# Patient Record
Sex: FEMALE | Race: BLACK OR AFRICAN AMERICAN | NOT HISPANIC OR LATINO | Employment: FULL TIME | URBAN - METROPOLITAN AREA
[De-identification: names, ages, dates, MRNs, and addresses within clinical notes are randomized per-mention and may not be internally consistent; named-entity substitution may affect disease eponyms.]

---

## 2019-05-06 ENCOUNTER — HOSPITAL ENCOUNTER (OUTPATIENT)
Dept: LAB | Facility: MEDICAL CENTER | Age: 29
End: 2019-05-06
Attending: OBSTETRICS & GYNECOLOGY
Payer: COMMERCIAL

## 2019-05-06 LAB
ABO GROUP BLD: NORMAL
BASOPHILS # BLD AUTO: 0.5 % (ref 0–1.8)
BASOPHILS # BLD: 0.06 K/UL (ref 0–0.12)
BLD GP AB SCN SERPL QL: NORMAL
EOSINOPHIL # BLD AUTO: 0.14 K/UL (ref 0–0.51)
EOSINOPHIL NFR BLD: 1.1 % (ref 0–6.9)
ERYTHROCYTE [DISTWIDTH] IN BLOOD BY AUTOMATED COUNT: 35.7 FL (ref 35.9–50)
HBV SURFACE AG SER QL: NEGATIVE
HCT VFR BLD AUTO: 41.1 % (ref 37–47)
HCV AB SER QL: NEGATIVE
HGB BLD-MCNC: 13.9 G/DL (ref 12–16)
HIV 1+2 AB+HIV1 P24 AG SERPL QL IA: NON REACTIVE
IMM GRANULOCYTES # BLD AUTO: 0.04 K/UL (ref 0–0.11)
IMM GRANULOCYTES NFR BLD AUTO: 0.3 % (ref 0–0.9)
LYMPHOCYTES # BLD AUTO: 2.06 K/UL (ref 1–4.8)
LYMPHOCYTES NFR BLD: 16.2 % (ref 22–41)
MCH RBC QN AUTO: 26.8 PG (ref 27–33)
MCHC RBC AUTO-ENTMCNC: 33.8 G/DL (ref 33.6–35)
MCV RBC AUTO: 79.3 FL (ref 81.4–97.8)
MONOCYTES # BLD AUTO: 0.91 K/UL (ref 0–0.85)
MONOCYTES NFR BLD AUTO: 7.2 % (ref 0–13.4)
NEUTROPHILS # BLD AUTO: 9.51 K/UL (ref 2–7.15)
NEUTROPHILS NFR BLD: 74.7 % (ref 44–72)
NRBC # BLD AUTO: 0 K/UL
NRBC BLD-RTO: 0 /100 WBC
PLATELET # BLD AUTO: 304 K/UL (ref 164–446)
PMV BLD AUTO: 10.1 FL (ref 9–12.9)
RBC # BLD AUTO: 5.18 M/UL (ref 4.2–5.4)
RH BLD: NORMAL
RUBV AB SER QL: >500 IU/ML
TREPONEMA PALLIDUM IGG+IGM AB [PRESENCE] IN SERUM OR PLASMA BY IMMUNOASSAY: NON REACTIVE
WBC # BLD AUTO: 12.7 K/UL (ref 4.8–10.8)

## 2019-05-06 PROCEDURE — 85025 COMPLETE CBC W/AUTO DIFF WBC: CPT

## 2019-05-06 PROCEDURE — 87086 URINE CULTURE/COLONY COUNT: CPT

## 2019-05-06 PROCEDURE — 86900 BLOOD TYPING SEROLOGIC ABO: CPT

## 2019-05-06 PROCEDURE — 87491 CHLMYD TRACH DNA AMP PROBE: CPT

## 2019-05-06 PROCEDURE — 86780 TREPONEMA PALLIDUM: CPT

## 2019-05-06 PROCEDURE — 83021 HEMOGLOBIN CHROMOTOGRAPHY: CPT

## 2019-05-06 PROCEDURE — 87591 N.GONORRHOEAE DNA AMP PROB: CPT

## 2019-05-06 PROCEDURE — 88175 CYTOPATH C/V AUTO FLUID REDO: CPT

## 2019-05-06 PROCEDURE — 87389 HIV-1 AG W/HIV-1&-2 AB AG IA: CPT

## 2019-05-06 PROCEDURE — 86762 RUBELLA ANTIBODY: CPT

## 2019-05-06 PROCEDURE — 86803 HEPATITIS C AB TEST: CPT

## 2019-05-06 PROCEDURE — 86901 BLOOD TYPING SEROLOGIC RH(D): CPT

## 2019-05-06 PROCEDURE — 86850 RBC ANTIBODY SCREEN: CPT

## 2019-05-06 PROCEDURE — 36415 COLL VENOUS BLD VENIPUNCTURE: CPT

## 2019-05-06 PROCEDURE — 87340 HEPATITIS B SURFACE AG IA: CPT

## 2019-05-08 LAB
BACTERIA UR CULT: NORMAL
HGB A1 MFR BLD: 60.2 % (ref 95–97.9)
HGB A2 MFR BLD: 3.6 % (ref 2–3.5)
HGB C MFR BLD: 36 % (ref 0–0)
HGB E MFR BLD: 0 % (ref 0–0)
HGB F MFR BLD: 0.2 % (ref 0–2.1)
HGB FRACT BLD ELPH-IMP: ABNORMAL
HGB OTHER MFR BLD: 0 % (ref 0–0)
HGB S BLD QL SOLY: ABNORMAL
HGB S MFR BLD: 0 % (ref 0–0)
PATH INTERP BLD-IMP: ABNORMAL
SIGNIFICANT IND 70042: NORMAL
SITE SITE: NORMAL
SOURCE SOURCE: NORMAL

## 2019-05-09 LAB
C TRACH DNA GENITAL QL NAA+PROBE: NEGATIVE
CYTOLOGY REG CYTOL: NORMAL
N GONORRHOEA DNA GENITAL QL NAA+PROBE: NEGATIVE
SPECIMEN SOURCE: NORMAL

## 2019-08-08 ENCOUNTER — HOSPITAL ENCOUNTER (OUTPATIENT)
Dept: LAB | Facility: MEDICAL CENTER | Age: 29
End: 2019-08-08
Attending: OBSTETRICS & GYNECOLOGY
Payer: COMMERCIAL

## 2019-08-08 PROCEDURE — 87591 N.GONORRHOEAE DNA AMP PROB: CPT

## 2019-08-08 PROCEDURE — 87491 CHLMYD TRACH DNA AMP PROBE: CPT

## 2019-08-09 LAB
C TRACH DNA SPEC QL NAA+PROBE: NEGATIVE
N GONORRHOEA DNA SPEC QL NAA+PROBE: NEGATIVE
SPECIMEN SOURCE: NORMAL

## 2019-08-12 ENCOUNTER — HOSPITAL ENCOUNTER (OUTPATIENT)
Dept: LAB | Facility: MEDICAL CENTER | Age: 29
End: 2019-08-12
Attending: OBSTETRICS & GYNECOLOGY
Payer: COMMERCIAL

## 2019-08-12 LAB
BLD GP AB SCN SERPL QL: NORMAL
HCT VFR BLD AUTO: 37.4 % (ref 37–47)
HGB BLD-MCNC: 12.9 G/DL (ref 12–16)
PLATELET # BLD AUTO: 242 K/UL (ref 164–446)

## 2019-08-12 PROCEDURE — 85018 HEMOGLOBIN: CPT

## 2019-08-12 PROCEDURE — 86780 TREPONEMA PALLIDUM: CPT

## 2019-08-12 PROCEDURE — 82950 GLUCOSE TEST: CPT

## 2019-08-12 PROCEDURE — 85014 HEMATOCRIT: CPT

## 2019-08-12 PROCEDURE — 86850 RBC ANTIBODY SCREEN: CPT

## 2019-08-12 PROCEDURE — 36415 COLL VENOUS BLD VENIPUNCTURE: CPT

## 2019-08-12 PROCEDURE — 85049 AUTOMATED PLATELET COUNT: CPT

## 2019-08-13 LAB
GLUCOSE 1H P 50 G GLC PO SERPL-MCNC: 110 MG/DL (ref 70–139)
TREPONEMA PALLIDUM IGG+IGM AB [PRESENCE] IN SERUM OR PLASMA BY IMMUNOASSAY: NON REACTIVE

## 2019-10-17 ENCOUNTER — HOSPITAL ENCOUNTER (OUTPATIENT)
Facility: MEDICAL CENTER | Age: 29
End: 2019-10-17
Attending: OBSTETRICS & GYNECOLOGY | Admitting: OBSTETRICS & GYNECOLOGY
Payer: COMMERCIAL

## 2019-10-17 VITALS
SYSTOLIC BLOOD PRESSURE: 125 MMHG | OXYGEN SATURATION: 98 % | HEART RATE: 71 BPM | DIASTOLIC BLOOD PRESSURE: 61 MMHG | RESPIRATION RATE: 16 BRPM | TEMPERATURE: 97.9 F

## 2019-10-17 LAB — CRYSTALS AMN MICRO: NORMAL

## 2019-10-17 PROCEDURE — 89060 EXAM SYNOVIAL FLUID CRYSTALS: CPT

## 2019-10-17 PROCEDURE — 59025 FETAL NON-STRESS TEST: CPT

## 2019-10-17 NOTE — PROGRESS NOTES
EDC  35 2/     1140-pt presents from home with c/o leaking fluid this morning, no c/o bleeding, pain or uc's, states baby is moving normally, placed on external monitors, vs taken, SSE performed, no pooling noted, miguelito slide prepared and sent, SVE 1/thick/high  1250-miguelito back negative, TC Dr Todd, report given, discharge order received  1255-pt discharged home with labor precautions, verbalized understanding, left ambulatory on her own

## 2019-11-11 ENCOUNTER — ANESTHESIA (OUTPATIENT)
Dept: OBGYN | Facility: MEDICAL CENTER | Age: 29
End: 2019-11-11
Payer: MEDICAID

## 2019-11-11 ENCOUNTER — HOSPITAL ENCOUNTER (INPATIENT)
Facility: MEDICAL CENTER | Age: 29
LOS: 3 days | End: 2019-11-14
Attending: OBSTETRICS & GYNECOLOGY | Admitting: OBSTETRICS & GYNECOLOGY
Payer: MEDICAID

## 2019-11-11 ENCOUNTER — ANESTHESIA EVENT (OUTPATIENT)
Dept: OBGYN | Facility: MEDICAL CENTER | Age: 29
End: 2019-11-11
Payer: MEDICAID

## 2019-11-11 DIAGNOSIS — G89.18 POSTOPERATIVE PAIN: ICD-10-CM

## 2019-11-11 LAB
APPEARANCE UR: CLEAR
BASOPHILS # BLD AUTO: 0.2 % (ref 0–1.8)
BASOPHILS # BLD: 0.02 K/UL (ref 0–0.12)
COLOR UR AUTO: YELLOW
EOSINOPHIL # BLD AUTO: 0.06 K/UL (ref 0–0.51)
EOSINOPHIL NFR BLD: 0.6 % (ref 0–6.9)
ERYTHROCYTE [DISTWIDTH] IN BLOOD BY AUTOMATED COUNT: 38.9 FL (ref 35.9–50)
GLUCOSE UR QL STRIP.AUTO: NEGATIVE MG/DL
HCT VFR BLD AUTO: 38 % (ref 37–47)
HGB BLD-MCNC: 13.4 G/DL (ref 12–16)
HOLDING TUBE BB 8507: NORMAL
IMM GRANULOCYTES # BLD AUTO: 0.03 K/UL (ref 0–0.11)
IMM GRANULOCYTES NFR BLD AUTO: 0.3 % (ref 0–0.9)
KETONES UR QL STRIP.AUTO: NEGATIVE MG/DL
LEUKOCYTE ESTERASE UR QL STRIP.AUTO: NEGATIVE
LYMPHOCYTES # BLD AUTO: 1.49 K/UL (ref 1–4.8)
LYMPHOCYTES NFR BLD: 13.8 % (ref 22–41)
MCH RBC QN AUTO: 28 PG (ref 27–33)
MCHC RBC AUTO-ENTMCNC: 35.3 G/DL (ref 33.6–35)
MCV RBC AUTO: 79.5 FL (ref 81.4–97.8)
MONOCYTES # BLD AUTO: 0.62 K/UL (ref 0–0.85)
MONOCYTES NFR BLD AUTO: 5.7 % (ref 0–13.4)
NEUTROPHILS # BLD AUTO: 8.57 K/UL (ref 2–7.15)
NEUTROPHILS NFR BLD: 79.4 % (ref 44–72)
NITRITE UR QL STRIP.AUTO: NEGATIVE
NRBC # BLD AUTO: 0 K/UL
NRBC BLD-RTO: 0 /100 WBC
PH UR STRIP.AUTO: 7 [PH] (ref 5–8)
PLATELET # BLD AUTO: 255 K/UL (ref 164–446)
PMV BLD AUTO: 10.8 FL (ref 9–12.9)
PROT UR QL STRIP: NEGATIVE MG/DL
RBC # BLD AUTO: 4.78 M/UL (ref 4.2–5.4)
RBC UR QL AUTO: ABNORMAL
SP GR UR: 1.02 (ref 1–1.03)
WBC # BLD AUTO: 10.8 K/UL (ref 4.8–10.8)

## 2019-11-11 PROCEDURE — 700102 HCHG RX REV CODE 250 W/ 637 OVERRIDE(OP): Performed by: ANESTHESIOLOGY

## 2019-11-11 PROCEDURE — 305385 HCHG SURGICAL SERVICES 1/4 HOUR: Performed by: OBSTETRICS & GYNECOLOGY

## 2019-11-11 PROCEDURE — 306828 HCHG ANES-TIME GENERAL: Performed by: OBSTETRICS & GYNECOLOGY

## 2019-11-11 PROCEDURE — A9270 NON-COVERED ITEM OR SERVICE: HCPCS | Performed by: ANESTHESIOLOGY

## 2019-11-11 PROCEDURE — 700111 HCHG RX REV CODE 636 W/ 250 OVERRIDE (IP)

## 2019-11-11 PROCEDURE — A6212 FOAM DRG <=16 SQ IN W/BORDER: HCPCS

## 2019-11-11 PROCEDURE — 85025 COMPLETE CBC W/AUTO DIFF WBC: CPT

## 2019-11-11 PROCEDURE — 700111 HCHG RX REV CODE 636 W/ 250 OVERRIDE (IP): Performed by: ANESTHESIOLOGY

## 2019-11-11 PROCEDURE — 700105 HCHG RX REV CODE 258: Performed by: ANESTHESIOLOGY

## 2019-11-11 PROCEDURE — 10H07YZ INSERTION OF OTHER DEVICE INTO PRODUCTS OF CONCEPTION, VIA NATURAL OR ARTIFICIAL OPENING: ICD-10-PCS | Performed by: OBSTETRICS & GYNECOLOGY

## 2019-11-11 PROCEDURE — 59514 CESAREAN DELIVERY ONLY: CPT

## 2019-11-11 PROCEDURE — 770002 HCHG ROOM/CARE - OB PRIVATE (112)

## 2019-11-11 PROCEDURE — 304966 HCHG RECOVERY SVSC TIME ADDL 1/2 HR: Performed by: OBSTETRICS & GYNECOLOGY

## 2019-11-11 PROCEDURE — 36415 COLL VENOUS BLD VENIPUNCTURE: CPT

## 2019-11-11 PROCEDURE — 700105 HCHG RX REV CODE 258

## 2019-11-11 PROCEDURE — 700101 HCHG RX REV CODE 250: Performed by: ANESTHESIOLOGY

## 2019-11-11 PROCEDURE — 700102 HCHG RX REV CODE 250 W/ 637 OVERRIDE(OP)

## 2019-11-11 PROCEDURE — 88307 TISSUE EXAM BY PATHOLOGIST: CPT

## 2019-11-11 PROCEDURE — 10907ZC DRAINAGE OF AMNIOTIC FLUID, THERAPEUTIC FROM PRODUCTS OF CONCEPTION, VIA NATURAL OR ARTIFICIAL OPENING: ICD-10-PCS | Performed by: OBSTETRICS & GYNECOLOGY

## 2019-11-11 PROCEDURE — 81002 URINALYSIS NONAUTO W/O SCOPE: CPT

## 2019-11-11 PROCEDURE — 700105 HCHG RX REV CODE 258: Performed by: OBSTETRICS & GYNECOLOGY

## 2019-11-11 PROCEDURE — 303615 HCHG EPIDURAL/SPINAL ANESTHESIA FOR LABOR

## 2019-11-11 PROCEDURE — 700111 HCHG RX REV CODE 636 W/ 250 OVERRIDE (IP): Performed by: OBSTETRICS & GYNECOLOGY

## 2019-11-11 PROCEDURE — A9270 NON-COVERED ITEM OR SERVICE: HCPCS

## 2019-11-11 PROCEDURE — 304964 HCHG RECOVERY ROOM TIME 1HR: Performed by: OBSTETRICS & GYNECOLOGY

## 2019-11-11 RX ORDER — METOCLOPRAMIDE HYDROCHLORIDE 5 MG/ML
INJECTION INTRAMUSCULAR; INTRAVENOUS
Status: COMPLETED
Start: 2019-11-11 | End: 2019-11-11

## 2019-11-11 RX ORDER — SODIUM CHLORIDE, SODIUM GLUCONATE, SODIUM ACETATE, POTASSIUM CHLORIDE AND MAGNESIUM CHLORIDE 526; 502; 368; 37; 30 MG/100ML; MG/100ML; MG/100ML; MG/100ML; MG/100ML
INJECTION, SOLUTION INTRAVENOUS
Status: COMPLETED
Start: 2019-11-11 | End: 2019-11-11

## 2019-11-11 RX ORDER — MEPERIDINE HYDROCHLORIDE 25 MG/ML
12.5 INJECTION INTRAMUSCULAR; INTRAVENOUS; SUBCUTANEOUS
Status: DISCONTINUED | OUTPATIENT
Start: 2019-11-11 | End: 2019-11-11 | Stop reason: HOSPADM

## 2019-11-11 RX ORDER — HYDROMORPHONE HYDROCHLORIDE 1 MG/ML
0.2 INJECTION, SOLUTION INTRAMUSCULAR; INTRAVENOUS; SUBCUTANEOUS
Status: DISCONTINUED | OUTPATIENT
Start: 2019-11-11 | End: 2019-11-11 | Stop reason: HOSPADM

## 2019-11-11 RX ORDER — OXYCODONE HYDROCHLORIDE AND ACETAMINOPHEN 5; 325 MG/1; MG/1
2 TABLET ORAL
Status: COMPLETED | OUTPATIENT
Start: 2019-11-11 | End: 2019-11-11

## 2019-11-11 RX ORDER — ONDANSETRON 2 MG/ML
INJECTION INTRAMUSCULAR; INTRAVENOUS PRN
Status: DISCONTINUED | OUTPATIENT
Start: 2019-11-11 | End: 2019-11-11 | Stop reason: SURG

## 2019-11-11 RX ORDER — LIDOCAINE HYDROCHLORIDE 20 MG/ML
INJECTION, SOLUTION EPIDURAL; INFILTRATION; INTRACAUDAL; PERINEURAL PRN
Status: DISCONTINUED | OUTPATIENT
Start: 2019-11-11 | End: 2019-11-11 | Stop reason: SURG

## 2019-11-11 RX ORDER — ONDANSETRON 4 MG/1
4 TABLET, ORALLY DISINTEGRATING ORAL EVERY 6 HOURS PRN
Status: CANCELLED | OUTPATIENT
Start: 2019-11-11

## 2019-11-11 RX ORDER — ONDANSETRON 2 MG/ML
4 INJECTION INTRAMUSCULAR; INTRAVENOUS EVERY 6 HOURS PRN
Status: CANCELLED | OUTPATIENT
Start: 2019-11-11

## 2019-11-11 RX ORDER — HYDRALAZINE HYDROCHLORIDE 20 MG/ML
5 INJECTION INTRAMUSCULAR; INTRAVENOUS
Status: DISCONTINUED | OUTPATIENT
Start: 2019-11-11 | End: 2019-11-11 | Stop reason: HOSPADM

## 2019-11-11 RX ORDER — ACETAMINOPHEN 500 MG
1000 TABLET ORAL EVERY 6 HOURS
Status: DISPENSED | OUTPATIENT
Start: 2019-11-12 | End: 2019-11-12

## 2019-11-11 RX ORDER — HYDROMORPHONE HYDROCHLORIDE 1 MG/ML
0.2 INJECTION, SOLUTION INTRAMUSCULAR; INTRAVENOUS; SUBCUTANEOUS
Status: DISCONTINUED | OUTPATIENT
Start: 2019-11-11 | End: 2019-11-12

## 2019-11-11 RX ORDER — VITAMIN A ACETATE, BETA CAROTENE, ASCORBIC ACID, CHOLECALCIFEROL, .ALPHA.-TOCOPHEROL ACETATE, DL-, THIAMINE MONONITRATE, RIBOFLAVIN, NIACINAMIDE, PYRIDOXINE HYDROCHLORIDE, FOLIC ACID, CYANOCOBALAMIN, CALCIUM CARBONATE, FERROUS FUMARATE, ZINC OXIDE, CUPRIC OXIDE 3080; 12; 120; 400; 1; 1.84; 3; 20; 22; 920; 25; 200; 27; 10; 2 [IU]/1; UG/1; MG/1; [IU]/1; MG/1; MG/1; MG/1; MG/1; MG/1; [IU]/1; MG/1; MG/1; MG/1; MG/1; MG/1
1 TABLET, FILM COATED ORAL EVERY MORNING
Status: DISCONTINUED | OUTPATIENT
Start: 2019-11-12 | End: 2019-11-14 | Stop reason: HOSPADM

## 2019-11-11 RX ORDER — DIPHENHYDRAMINE HYDROCHLORIDE 50 MG/ML
12.5 INJECTION INTRAMUSCULAR; INTRAVENOUS EVERY 6 HOURS PRN
Status: DISCONTINUED | OUTPATIENT
Start: 2019-11-11 | End: 2019-11-12

## 2019-11-11 RX ORDER — ROPIVACAINE HYDROCHLORIDE 2 MG/ML
INJECTION, SOLUTION EPIDURAL; INFILTRATION; PERINEURAL CONTINUOUS
Status: DISCONTINUED | OUTPATIENT
Start: 2019-11-11 | End: 2019-11-11 | Stop reason: HOSPADM

## 2019-11-11 RX ORDER — PHENYLEPHRINE HYDROCHLORIDE 10 MG/ML
INJECTION, SOLUTION INTRAMUSCULAR; INTRAVENOUS; SUBCUTANEOUS PRN
Status: DISCONTINUED | OUTPATIENT
Start: 2019-11-11 | End: 2019-11-11 | Stop reason: SURG

## 2019-11-11 RX ORDER — MISOPROSTOL 200 UG/1
600 TABLET ORAL
Status: DISCONTINUED | OUTPATIENT
Start: 2019-11-11 | End: 2019-11-14 | Stop reason: HOSPADM

## 2019-11-11 RX ORDER — CARBOPROST TROMETHAMINE 250 UG/ML
250 INJECTION, SOLUTION INTRAMUSCULAR
Status: DISCONTINUED | OUTPATIENT
Start: 2019-11-11 | End: 2019-11-14 | Stop reason: HOSPADM

## 2019-11-11 RX ORDER — ALUMINA, MAGNESIA, AND SIMETHICONE 2400; 2400; 240 MG/30ML; MG/30ML; MG/30ML
30 SUSPENSION ORAL EVERY 6 HOURS PRN
Status: DISCONTINUED | OUTPATIENT
Start: 2019-11-11 | End: 2019-11-11 | Stop reason: HOSPADM

## 2019-11-11 RX ORDER — SODIUM CHLORIDE, SODIUM LACTATE, POTASSIUM CHLORIDE, AND CALCIUM CHLORIDE .6; .31; .03; .02 G/100ML; G/100ML; G/100ML; G/100ML
250 INJECTION, SOLUTION INTRAVENOUS PRN
Status: DISCONTINUED | OUTPATIENT
Start: 2019-11-11 | End: 2019-11-11 | Stop reason: HOSPADM

## 2019-11-11 RX ORDER — LABETALOL HYDROCHLORIDE 5 MG/ML
5 INJECTION, SOLUTION INTRAVENOUS
Status: DISCONTINUED | OUTPATIENT
Start: 2019-11-11 | End: 2019-11-11 | Stop reason: HOSPADM

## 2019-11-11 RX ORDER — TERBUTALINE SULFATE 1 MG/ML
0.25 INJECTION, SOLUTION SUBCUTANEOUS PRN
Status: DISCONTINUED | OUTPATIENT
Start: 2019-11-11 | End: 2019-11-11 | Stop reason: HOSPADM

## 2019-11-11 RX ORDER — DOCUSATE SODIUM 100 MG/1
100 CAPSULE, LIQUID FILLED ORAL 2 TIMES DAILY PRN
Status: DISCONTINUED | OUTPATIENT
Start: 2019-11-11 | End: 2019-11-14 | Stop reason: HOSPADM

## 2019-11-11 RX ORDER — DIPHENHYDRAMINE HYDROCHLORIDE 50 MG/ML
12.5 INJECTION INTRAMUSCULAR; INTRAVENOUS
Status: DISCONTINUED | OUTPATIENT
Start: 2019-11-11 | End: 2019-11-11 | Stop reason: HOSPADM

## 2019-11-11 RX ORDER — KETOROLAC TROMETHAMINE 30 MG/ML
30 INJECTION, SOLUTION INTRAMUSCULAR; INTRAVENOUS EVERY 6 HOURS
Status: COMPLETED | OUTPATIENT
Start: 2019-11-12 | End: 2019-11-12

## 2019-11-11 RX ORDER — CITRIC ACID/SODIUM CITRATE 334-500MG
30 SOLUTION, ORAL ORAL EVERY 6 HOURS PRN
Status: DISCONTINUED | OUTPATIENT
Start: 2019-11-11 | End: 2019-11-11 | Stop reason: HOSPADM

## 2019-11-11 RX ORDER — CITRIC ACID/SODIUM CITRATE 334-500MG
SOLUTION, ORAL ORAL
Status: COMPLETED
Start: 2019-11-11 | End: 2019-11-11

## 2019-11-11 RX ORDER — KETOROLAC TROMETHAMINE 30 MG/ML
INJECTION, SOLUTION INTRAMUSCULAR; INTRAVENOUS PRN
Status: DISCONTINUED | OUTPATIENT
Start: 2019-11-11 | End: 2019-11-11 | Stop reason: SURG

## 2019-11-11 RX ORDER — MISOPROSTOL 200 UG/1
TABLET ORAL PRN
Status: DISCONTINUED | OUTPATIENT
Start: 2019-11-11 | End: 2019-11-11 | Stop reason: SURG

## 2019-11-11 RX ORDER — HYDROCODONE BITARTRATE AND ACETAMINOPHEN 5; 325 MG/1; MG/1
1 TABLET ORAL EVERY 4 HOURS PRN
Status: CANCELLED | OUTPATIENT
Start: 2019-11-11

## 2019-11-11 RX ORDER — HYDROMORPHONE HYDROCHLORIDE 1 MG/ML
0.1 INJECTION, SOLUTION INTRAMUSCULAR; INTRAVENOUS; SUBCUTANEOUS
Status: DISCONTINUED | OUTPATIENT
Start: 2019-11-11 | End: 2019-11-11 | Stop reason: HOSPADM

## 2019-11-11 RX ORDER — OXYCODONE HYDROCHLORIDE 5 MG/1
5 TABLET ORAL EVERY 4 HOURS PRN
Status: DISCONTINUED | OUTPATIENT
Start: 2019-11-11 | End: 2019-11-12

## 2019-11-11 RX ORDER — DEXTROSE, SODIUM CHLORIDE, SODIUM LACTATE, POTASSIUM CHLORIDE, AND CALCIUM CHLORIDE 5; .6; .31; .03; .02 G/100ML; G/100ML; G/100ML; G/100ML; G/100ML
INJECTION, SOLUTION INTRAVENOUS CONTINUOUS
Status: DISCONTINUED | OUTPATIENT
Start: 2019-11-11 | End: 2019-11-14 | Stop reason: HOSPADM

## 2019-11-11 RX ORDER — SODIUM CHLORIDE, SODIUM LACTATE, POTASSIUM CHLORIDE, CALCIUM CHLORIDE 600; 310; 30; 20 MG/100ML; MG/100ML; MG/100ML; MG/100ML
INJECTION, SOLUTION INTRAVENOUS CONTINUOUS
Status: DISCONTINUED | OUTPATIENT
Start: 2019-11-11 | End: 2019-11-11 | Stop reason: HOSPADM

## 2019-11-11 RX ORDER — ONDANSETRON 2 MG/ML
4 INJECTION INTRAMUSCULAR; INTRAVENOUS
Status: DISCONTINUED | OUTPATIENT
Start: 2019-11-11 | End: 2019-11-11 | Stop reason: HOSPADM

## 2019-11-11 RX ORDER — HALOPERIDOL 5 MG/ML
1 INJECTION INTRAMUSCULAR
Status: DISCONTINUED | OUTPATIENT
Start: 2019-11-11 | End: 2019-11-11 | Stop reason: HOSPADM

## 2019-11-11 RX ORDER — HYDROMORPHONE HYDROCHLORIDE 1 MG/ML
0.4 INJECTION, SOLUTION INTRAMUSCULAR; INTRAVENOUS; SUBCUTANEOUS
Status: DISCONTINUED | OUTPATIENT
Start: 2019-11-11 | End: 2019-11-11 | Stop reason: HOSPADM

## 2019-11-11 RX ORDER — ACETAMINOPHEN 325 MG/1
325 TABLET ORAL EVERY 4 HOURS PRN
Status: CANCELLED | OUTPATIENT
Start: 2019-11-11

## 2019-11-11 RX ORDER — IBUPROFEN 600 MG/1
600 TABLET ORAL EVERY 6 HOURS PRN
Status: CANCELLED | OUTPATIENT
Start: 2019-11-11

## 2019-11-11 RX ORDER — SODIUM CHLORIDE, SODIUM LACTATE, POTASSIUM CHLORIDE, CALCIUM CHLORIDE 600; 310; 30; 20 MG/100ML; MG/100ML; MG/100ML; MG/100ML
INJECTION, SOLUTION INTRAVENOUS PRN
Status: DISCONTINUED | OUTPATIENT
Start: 2019-11-11 | End: 2019-11-14 | Stop reason: HOSPADM

## 2019-11-11 RX ORDER — OXYCODONE HYDROCHLORIDE AND ACETAMINOPHEN 5; 325 MG/1; MG/1
1 TABLET ORAL
Status: COMPLETED | OUTPATIENT
Start: 2019-11-11 | End: 2019-11-11

## 2019-11-11 RX ORDER — SIMETHICONE 80 MG
80 TABLET,CHEWABLE ORAL 4 TIMES DAILY PRN
Status: DISCONTINUED | OUTPATIENT
Start: 2019-11-11 | End: 2019-11-14 | Stop reason: HOSPADM

## 2019-11-11 RX ORDER — ONDANSETRON 2 MG/ML
4 INJECTION INTRAMUSCULAR; INTRAVENOUS EVERY 6 HOURS PRN
Status: DISCONTINUED | OUTPATIENT
Start: 2019-11-11 | End: 2019-11-12

## 2019-11-11 RX ORDER — MISOPROSTOL 200 UG/1
800 TABLET ORAL
Status: DISCONTINUED | OUTPATIENT
Start: 2019-11-11 | End: 2019-11-11 | Stop reason: HOSPADM

## 2019-11-11 RX ORDER — SODIUM CHLORIDE, SODIUM LACTATE, POTASSIUM CHLORIDE, AND CALCIUM CHLORIDE .6; .31; .03; .02 G/100ML; G/100ML; G/100ML; G/100ML
1000 INJECTION, SOLUTION INTRAVENOUS
Status: DISCONTINUED | OUTPATIENT
Start: 2019-11-11 | End: 2019-11-11 | Stop reason: HOSPADM

## 2019-11-11 RX ORDER — SCOLOPAMINE TRANSDERMAL SYSTEM 1 MG/1
PATCH, EXTENDED RELEASE TRANSDERMAL PRN
Status: DISCONTINUED | OUTPATIENT
Start: 2019-11-11 | End: 2019-11-11 | Stop reason: SURG

## 2019-11-11 RX ORDER — OXYCODONE HYDROCHLORIDE 10 MG/1
10 TABLET ORAL EVERY 4 HOURS PRN
Status: DISCONTINUED | OUTPATIENT
Start: 2019-11-11 | End: 2019-11-12

## 2019-11-11 RX ORDER — LORAZEPAM 2 MG/ML
0.5 INJECTION INTRAMUSCULAR
Status: DISCONTINUED | OUTPATIENT
Start: 2019-11-11 | End: 2019-11-11 | Stop reason: HOSPADM

## 2019-11-11 RX ORDER — METHYLERGONOVINE MALEATE 0.2 MG/ML
0.2 INJECTION INTRAVENOUS
Status: DISCONTINUED | OUTPATIENT
Start: 2019-11-11 | End: 2019-11-14 | Stop reason: HOSPADM

## 2019-11-11 RX ORDER — HYDROMORPHONE HYDROCHLORIDE 1 MG/ML
0.4 INJECTION, SOLUTION INTRAMUSCULAR; INTRAVENOUS; SUBCUTANEOUS
Status: DISCONTINUED | OUTPATIENT
Start: 2019-11-11 | End: 2019-11-12

## 2019-11-11 RX ORDER — SODIUM CHLORIDE, SODIUM GLUCONATE, SODIUM ACETATE, POTASSIUM CHLORIDE AND MAGNESIUM CHLORIDE 526; 502; 368; 37; 30 MG/100ML; MG/100ML; MG/100ML; MG/100ML; MG/100ML
INJECTION, SOLUTION INTRAVENOUS
Status: DISCONTINUED | OUTPATIENT
Start: 2019-11-11 | End: 2019-11-11 | Stop reason: SURG

## 2019-11-11 RX ORDER — HYDROCODONE BITARTRATE AND ACETAMINOPHEN 10; 325 MG/1; MG/1
1 TABLET ORAL EVERY 4 HOURS PRN
Status: CANCELLED | OUTPATIENT
Start: 2019-11-11

## 2019-11-11 RX ORDER — MORPHINE SULFATE 0.5 MG/ML
INJECTION, SOLUTION EPIDURAL; INTRATHECAL; INTRAVENOUS PRN
Status: DISCONTINUED | OUTPATIENT
Start: 2019-11-11 | End: 2019-11-11 | Stop reason: SURG

## 2019-11-11 RX ORDER — ROPIVACAINE HYDROCHLORIDE 2 MG/ML
INJECTION, SOLUTION EPIDURAL; INFILTRATION; PERINEURAL
Status: COMPLETED
Start: 2019-11-11 | End: 2019-11-11

## 2019-11-11 RX ORDER — CEFAZOLIN SODIUM 1 G/3ML
INJECTION, POWDER, FOR SOLUTION INTRAMUSCULAR; INTRAVENOUS PRN
Status: DISCONTINUED | OUTPATIENT
Start: 2019-11-11 | End: 2019-11-11 | Stop reason: SURG

## 2019-11-11 RX ORDER — DIPHENHYDRAMINE HYDROCHLORIDE 50 MG/ML
25 INJECTION INTRAMUSCULAR; INTRAVENOUS EVERY 6 HOURS PRN
Status: DISCONTINUED | OUTPATIENT
Start: 2019-11-11 | End: 2019-11-12

## 2019-11-11 RX ADMIN — LIDOCAINE HYDROCHLORIDE 10 ML: 20 INJECTION, SOLUTION EPIDURAL; INFILTRATION; INTRACAUDAL at 21:01

## 2019-11-11 RX ADMIN — SODIUM CHLORIDE, POTASSIUM CHLORIDE, SODIUM LACTATE AND CALCIUM CHLORIDE: 600; 310; 30; 20 INJECTION, SOLUTION INTRAVENOUS at 16:00

## 2019-11-11 RX ADMIN — KETOROLAC TROMETHAMINE 30 MG: 30 INJECTION, SOLUTION INTRAMUSCULAR at 21:58

## 2019-11-11 RX ADMIN — OXYTOCIN 100 ML: 10 INJECTION, SOLUTION INTRAMUSCULAR; INTRAVENOUS at 21:36

## 2019-11-11 RX ADMIN — SODIUM CHLORIDE, SODIUM GLUCONATE, SODIUM ACETATE, POTASSIUM CHLORIDE AND MAGNESIUM CHLORIDE: 526; 502; 368; 37; 30 INJECTION, SOLUTION INTRAVENOUS at 16:27

## 2019-11-11 RX ADMIN — FAMOTIDINE 20 MG: 10 INJECTION INTRAVENOUS at 20:46

## 2019-11-11 RX ADMIN — ONDANSETRON 4 MG: 2 INJECTION INTRAMUSCULAR; INTRAVENOUS at 21:57

## 2019-11-11 RX ADMIN — METOCLOPRAMIDE 10 MG: 5 INJECTION, SOLUTION INTRAMUSCULAR; INTRAVENOUS at 20:46

## 2019-11-11 RX ADMIN — ROPIVACAINE HYDROCHLORIDE: 2 INJECTION, SOLUTION EPIDURAL; INFILTRATION at 16:39

## 2019-11-11 RX ADMIN — FENTANYL CITRATE 50 MCG: 50 INJECTION, SOLUTION INTRAMUSCULAR; INTRAVENOUS at 22:11

## 2019-11-11 RX ADMIN — Medication 1 MILLI-UNITS/MIN: at 16:45

## 2019-11-11 RX ADMIN — SODIUM CITRATE AND CITRIC ACID MONOHYDRATE 30 ML: 500; 334 SOLUTION ORAL at 20:46

## 2019-11-11 RX ADMIN — BUPIVACAINE HYDROCHLORIDE 10 ML: 2.5 INJECTION, SOLUTION EPIDURAL; INFILTRATION; INTRACAUDAL; PERINEURAL at 16:36

## 2019-11-11 RX ADMIN — OXYCODONE HYDROCHLORIDE AND ACETAMINOPHEN 2 TABLET: 5; 325 TABLET ORAL at 22:39

## 2019-11-11 RX ADMIN — OXYTOCIN 100 ML: 10 INJECTION, SOLUTION INTRAMUSCULAR; INTRAVENOUS at 21:52

## 2019-11-11 RX ADMIN — SCOPOLAMINE 1 PATCH: 1 PATCH, EXTENDED RELEASE TRANSDERMAL at 21:08

## 2019-11-11 RX ADMIN — MISOPROSTOL 1000 MCG: 200 TABLET ORAL at 21:58

## 2019-11-11 RX ADMIN — ROPIVACAINE HYDROCHLORIDE: 2 INJECTION, SOLUTION EPIDURAL; INFILTRATION; PERINEURAL at 16:39

## 2019-11-11 RX ADMIN — SODIUM CHLORIDE, SODIUM GLUCONATE, SODIUM ACETATE, POTASSIUM CHLORIDE AND MAGNESIUM CHLORIDE 1000 ML: 526; 502; 368; 37; 30 INJECTION, SOLUTION INTRAVENOUS at 20:47

## 2019-11-11 RX ADMIN — OXYTOCIN 100 ML: 10 INJECTION, SOLUTION INTRAMUSCULAR; INTRAVENOUS at 21:43

## 2019-11-11 RX ADMIN — OXYTOCIN 100 ML: 10 INJECTION, SOLUTION INTRAMUSCULAR; INTRAVENOUS at 21:22

## 2019-11-11 RX ADMIN — OXYTOCIN 100 ML: 10 INJECTION, SOLUTION INTRAMUSCULAR; INTRAVENOUS at 21:47

## 2019-11-11 RX ADMIN — OXYTOCIN 100 ML: 10 INJECTION, SOLUTION INTRAMUSCULAR; INTRAVENOUS at 21:38

## 2019-11-11 RX ADMIN — SODIUM CHLORIDE, SODIUM GLUCONATE, SODIUM ACETATE, POTASSIUM CHLORIDE AND MAGNESIUM CHLORIDE: 526; 502; 368; 37; 30 INJECTION, SOLUTION INTRAVENOUS at 21:08

## 2019-11-11 RX ADMIN — AZITHROMYCIN MONOHYDRATE 500 MG: 500 INJECTION, POWDER, LYOPHILIZED, FOR SOLUTION INTRAVENOUS at 22:12

## 2019-11-11 RX ADMIN — CEFAZOLIN 3 G: 330 INJECTION, POWDER, FOR SOLUTION INTRAMUSCULAR; INTRAVENOUS at 21:10

## 2019-11-11 RX ADMIN — Medication 30 ML: at 20:46

## 2019-11-11 RX ADMIN — PHENYLEPHRINE HYDROCHLORIDE 100 MCG: 10 INJECTION INTRAVENOUS at 21:19

## 2019-11-11 RX ADMIN — MORPHINE SULFATE 3 MG: 0.5 INJECTION, SOLUTION EPIDURAL; INTRATHECAL; INTRAVENOUS at 21:46

## 2019-11-11 RX ADMIN — SODIUM CHLORIDE, SODIUM LACTATE, POTASSIUM CHLORIDE, CALCIUM CHLORIDE AND DEXTROSE MONOHYDRATE: 5; 600; 310; 30; 20 INJECTION, SOLUTION INTRAVENOUS at 19:15

## 2019-11-11 RX ADMIN — LIDOCAINE HYDROCHLORIDE 10 ML: 20 INJECTION, SOLUTION EPIDURAL; INFILTRATION; INTRACAUDAL at 21:08

## 2019-11-11 SDOH — ECONOMIC STABILITY: FOOD INSECURITY: WITHIN THE PAST 12 MONTHS, THE FOOD YOU BOUGHT JUST DIDN'T LAST AND YOU DIDN'T HAVE MONEY TO GET MORE.: NEVER TRUE

## 2019-11-11 SDOH — ECONOMIC STABILITY: TRANSPORTATION INSECURITY
IN THE PAST 12 MONTHS, HAS LACK OF TRANSPORTATION KEPT YOU FROM MEETINGS, WORK, OR FROM GETTING THINGS NEEDED FOR DAILY LIVING?: NO

## 2019-11-11 SDOH — ECONOMIC STABILITY: FOOD INSECURITY: WITHIN THE PAST 12 MONTHS, YOU WORRIED THAT YOUR FOOD WOULD RUN OUT BEFORE YOU GOT MONEY TO BUY MORE.: NEVER TRUE

## 2019-11-11 SDOH — ECONOMIC STABILITY: TRANSPORTATION INSECURITY
IN THE PAST 12 MONTHS, HAS THE LACK OF TRANSPORTATION KEPT YOU FROM MEDICAL APPOINTMENTS OR FROM GETTING MEDICATIONS?: NO

## 2019-11-11 ASSESSMENT — PATIENT HEALTH QUESTIONNAIRE - PHQ9
2. FEELING DOWN, DEPRESSED, IRRITABLE, OR HOPELESS: NOT AT ALL
1. LITTLE INTEREST OR PLEASURE IN DOING THINGS: NOT AT ALL
1. LITTLE INTEREST OR PLEASURE IN DOING THINGS: NOT AT ALL
2. FEELING DOWN, DEPRESSED, IRRITABLE, OR HOPELESS: NOT AT ALL
SUM OF ALL RESPONSES TO PHQ9 QUESTIONS 1 AND 2: 0
SUM OF ALL RESPONSES TO PHQ9 QUESTIONS 1 AND 2: 0

## 2019-11-11 ASSESSMENT — COPD QUESTIONNAIRES
IN THE PAST 12 MONTHS DO YOU DO LESS THAN YOU USED TO BECAUSE OF YOUR BREATHING PROBLEMS: DISAGREE/UNSURE
DO YOU EVER COUGH UP ANY MUCUS OR PHLEGM?: NO/ONLY WITH OCCASIONAL COLDS OR INFECTIONS
COPD SCREENING SCORE: 0
DURING THE PAST 4 WEEKS HOW MUCH DID YOU FEEL SHORT OF BREATH: NONE/LITTLE OF THE TIME
HAVE YOU SMOKED AT LEAST 100 CIGARETTES IN YOUR ENTIRE LIFE: NO/DON'T KNOW

## 2019-11-11 ASSESSMENT — LIFESTYLE VARIABLES
ALCOHOL_USE: NO
TOTAL SCORE: 0
EVER_SMOKED: NEVER
CONSUMPTION TOTAL: NEGATIVE
TOTAL SCORE: 0
HAVE PEOPLE ANNOYED YOU BY CRITICIZING YOUR DRINKING: NO
EVER HAD A DRINK FIRST THING IN THE MORNING TO STEADY YOUR NERVES TO GET RID OF A HANGOVER: NO
HAVE YOU EVER FELT YOU SHOULD CUT DOWN ON YOUR DRINKING: NO
HOW MANY TIMES IN THE PAST YEAR HAVE YOU HAD 5 OR MORE DRINKS IN A DAY: 0
AVERAGE NUMBER OF DAYS PER WEEK YOU HAVE A DRINK CONTAINING ALCOHOL: 0
ON A TYPICAL DAY WHEN YOU DRINK ALCOHOL HOW MANY DRINKS DO YOU HAVE: 0
EVER FELT BAD OR GUILTY ABOUT YOUR DRINKING: NO
TOTAL SCORE: 0

## 2019-11-11 NOTE — CARE PLAN
Problem: Pain  Goal: Alleviation of Pain or a reduction in pain to the patient's comfort goal  Outcome: PROGRESSING AS EXPECTED  Note:   Pt coping well with UCs; desires IV fentanyl when needed. Moving around right now     Problem: Risk for Infection, Impaired Wound Healing  Goal: Remain free from signs and symptoms of infection  Outcome: PROGRESSING AS EXPECTED  Note:   No signs of infection

## 2019-11-11 NOTE — PROGRESS NOTES
1135: Report received from Renetta FARRAR. Pt transferred to 224. Pt would like to labor without epidural. Admission procedures completed  1155: Dr. Todd at bedside; reviewed tracing. Pt has birth plan; received ok to ambulate  1300: Sitting on birthing ball; pt states UCs are more painful. Monitors applied; difficult to monitor d/t pt's obesity; unable to trace UCs while lying on far side but reports frequent UCs  1430: Report given to Kassandra FARRAR

## 2019-11-11 NOTE — CARE PLAN
Problem: Pain  Goal: Alleviation of Pain or a reduction in pain to the patient's comfort goal  Outcome: PROGRESSING AS EXPECTED  Note:   Pain management options discussed, pt coping well     Problem: Risk for Infection, Impaired Wound Healing  Goal: Remain free from signs and symptoms of infection  Outcome: PROGRESSING AS EXPECTED  Note:   No s/s of infection noted, pt remains afebrile

## 2019-11-11 NOTE — PROGRESS NOTES
EDC - 19 EGA - 38.6    1430 - Report received from YUSUF Courtney RN, Pt breathing through [facility] at this time, heat packs given, POC discussed, pt denies needs at this time. 12hr chart check completed.  1510 Pt requesting to eat at this time, call made to Dr. Todd's office, awaiting return phone call  1525 Dr. Todd called, requesting SVE at this time  1529 SVE 4-5//-2. BBOW. Dr. Todd called, updated on pt status, pt may eat at this time, then start pitocin. Dr. Todd will be here to see pt after office hours. Pt notified, pt requesting an epidural after pitocin.   1625 - Dr. Carcamo at bedside. Time out called at 1625. Epidural placed at this time by Dr Carcamo. Test dose at 1638 - No reaction detected - VSS. Dermatome level of T8. Epidural infusion settings are : 10mL/hr continuous infusion, 5mL bolus every 15 minutes with a 25mL/hr dose limit.  1705 Pt repositioned at this time   1720 Fluid bolus started at this time  1730 - Ge placed and draining to gravity. Turned to right side. No complaints at this time.   1755 Dr. oTdd called, states he will call back shortly  1800 Pitocin stopped, Oxygen placed, via face mask at 10L/min  1805 Dr. Todd reviewed tracing at this time, states he will be by shortly to see pt.  183 Dr. Todd at bedside, SVE unchanged.   AROM mod-thick meconium. IUPC and FSE placed at this time.   Dr. Todd performed scalp stimulation at this time with accels noted   D5LR started at this time.   Dr. Fung at bedside, SVE -unchanged. C-section called at this time for fetal intolerance to labor. Pt prepped for surgery at this time.   Pt transferred to OR2 at this time via labor bed.   Report given to YUSUF Santos RN

## 2019-11-11 NOTE — PROGRESS NOTES
ADMIT    30 yo , LB 2019 by 11 week CRL, now 38 6/7 wks    Spont. Labor, membranes intact, ryan all day yesterday and all night.  Cervix changed from 2 to 4 cm dilated during observation.  Ctx Q 3 minutes.  FHR Cat I.    OBHx: 4 EAb, 2 medical and 2 surgical    PNC: O neg, got RhoGam; R imm; GBS neg; GDM screen normal; Hgb electrophoresis---HgbC heterozygote, FOB refused testing; obese, gained only 3# between 11 and 34 weeks (268---271#);  U/S 32 weeks showed AGA baby, 2093g/57th %ile, ant. Fundal placenta. U/S 21 weeks---normal anatomy.    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, HH 13.9/41.1    PSHx: D and E ,     NKDA    Med: PNV    T 97.0, /81    Results for KAREN URIAS (MRN [id number]) as of 2019 12:19   2019 10:55 2019 10:56   WBC 10.8    Hemoglobin 13.4    Hematocrit 38.0    Platelet Count 255    POC Color  Yellow   POC Appearance  Clear   POC Specific Gravity  1.020   POC Urine PH  7.0   POC Glucose  Negative   POC Ketones  Negative   POC Protein  Negative   POC Nitrites  Negative   POC Leukocyte Esterase  Negative   POC Blood  Trace-intact (A)         DX:    38 6/7 wks  Labor  GBS neg  Obese  Mild non-proteinuric gest HTN, likely secondary to pain  HgbC heterozygote, FOB status unknown    PLAN:  Deliver.  Pt. Ambulating, hopes to avoid epidural.

## 2019-11-12 LAB
ACTION RH IMMUNE GLOB 8505RHG: NORMAL
ERYTHROCYTE [DISTWIDTH] IN BLOOD BY AUTOMATED COUNT: 39.4 FL (ref 35.9–50)
HCT VFR BLD AUTO: 35.4 % (ref 37–47)
HGB BLD-MCNC: 12 G/DL (ref 12–16)
IMMUNE ROSETTING TEST 8505FMH: NORMAL
MCH RBC QN AUTO: 27 PG (ref 27–33)
MCHC RBC AUTO-ENTMCNC: 33.9 G/DL (ref 33.6–35)
MCV RBC AUTO: 79.6 FL (ref 81.4–97.8)
NUMBER OF RH DOSES IND 8505RD: 1
PATHOLOGY CONSULT NOTE: NORMAL
PLATELET # BLD AUTO: 222 K/UL (ref 164–446)
PMV BLD AUTO: 11 FL (ref 9–12.9)
RBC # BLD AUTO: 4.45 M/UL (ref 4.2–5.4)
RH BLD: NORMAL
WBC # BLD AUTO: 14.8 K/UL (ref 4.8–10.8)

## 2019-11-12 PROCEDURE — A9270 NON-COVERED ITEM OR SERVICE: HCPCS | Performed by: ANESTHESIOLOGY

## 2019-11-12 PROCEDURE — 700112 HCHG RX REV CODE 229: Performed by: OBSTETRICS & GYNECOLOGY

## 2019-11-12 PROCEDURE — 90686 IIV4 VACC NO PRSV 0.5 ML IM: CPT | Performed by: OBSTETRICS & GYNECOLOGY

## 2019-11-12 PROCEDURE — 85461 HEMOGLOBIN FETAL: CPT

## 2019-11-12 PROCEDURE — 700111 HCHG RX REV CODE 636 W/ 250 OVERRIDE (IP): Performed by: ANESTHESIOLOGY

## 2019-11-12 PROCEDURE — 770002 HCHG ROOM/CARE - OB PRIVATE (112)

## 2019-11-12 PROCEDURE — 700102 HCHG RX REV CODE 250 W/ 637 OVERRIDE(OP): Performed by: ANESTHESIOLOGY

## 2019-11-12 PROCEDURE — 90471 IMMUNIZATION ADMIN: CPT

## 2019-11-12 PROCEDURE — 36415 COLL VENOUS BLD VENIPUNCTURE: CPT

## 2019-11-12 PROCEDURE — A9270 NON-COVERED ITEM OR SERVICE: HCPCS | Performed by: OBSTETRICS & GYNECOLOGY

## 2019-11-12 PROCEDURE — 85027 COMPLETE CBC AUTOMATED: CPT

## 2019-11-12 PROCEDURE — 86901 BLOOD TYPING SEROLOGIC RH(D): CPT

## 2019-11-12 PROCEDURE — 3E02340 INTRODUCTION OF INFLUENZA VACCINE INTO MUSCLE, PERCUTANEOUS APPROACH: ICD-10-PCS | Performed by: OBSTETRICS & GYNECOLOGY

## 2019-11-12 PROCEDURE — 700111 HCHG RX REV CODE 636 W/ 250 OVERRIDE (IP): Performed by: OBSTETRICS & GYNECOLOGY

## 2019-11-12 RX ORDER — IBUPROFEN 600 MG/1
600 TABLET ORAL EVERY 6 HOURS PRN
Status: DISCONTINUED | OUTPATIENT
Start: 2019-11-12 | End: 2019-11-14 | Stop reason: HOSPADM

## 2019-11-12 RX ORDER — OXYCODONE AND ACETAMINOPHEN 10; 325 MG/1; MG/1
1 TABLET ORAL EVERY 4 HOURS PRN
Status: DISCONTINUED | OUTPATIENT
Start: 2019-11-12 | End: 2019-11-14 | Stop reason: HOSPADM

## 2019-11-12 RX ORDER — OXYCODONE HYDROCHLORIDE AND ACETAMINOPHEN 5; 325 MG/1; MG/1
1 TABLET ORAL EVERY 4 HOURS PRN
Status: DISCONTINUED | OUTPATIENT
Start: 2019-11-12 | End: 2019-11-14 | Stop reason: HOSPADM

## 2019-11-12 RX ORDER — MORPHINE SULFATE 4 MG/ML
4 INJECTION, SOLUTION INTRAMUSCULAR; INTRAVENOUS
Status: DISCONTINUED | OUTPATIENT
Start: 2019-11-12 | End: 2019-11-14 | Stop reason: HOSPADM

## 2019-11-12 RX ORDER — ONDANSETRON 2 MG/ML
4 INJECTION INTRAMUSCULAR; INTRAVENOUS EVERY 6 HOURS PRN
Status: DISCONTINUED | OUTPATIENT
Start: 2019-11-12 | End: 2019-11-14 | Stop reason: HOSPADM

## 2019-11-12 RX ORDER — ONDANSETRON 4 MG/1
4 TABLET, ORALLY DISINTEGRATING ORAL EVERY 6 HOURS PRN
Status: DISCONTINUED | OUTPATIENT
Start: 2019-11-12 | End: 2019-11-14 | Stop reason: HOSPADM

## 2019-11-12 RX ORDER — ACETAMINOPHEN 325 MG/1
325 TABLET ORAL EVERY 4 HOURS PRN
Status: DISCONTINUED | OUTPATIENT
Start: 2019-11-12 | End: 2019-11-14 | Stop reason: HOSPADM

## 2019-11-12 RX ADMIN — ACETAMINOPHEN 1000 MG: 500 TABLET ORAL at 16:03

## 2019-11-12 RX ADMIN — INFLUENZA A VIRUS A/BRISBANE/02/2018 IVR-190 (H1N1) ANTIGEN (FORMALDEHYDE INACTIVATED), INFLUENZA A VIRUS A/KANSAS/14/2017 X-327 (H3N2) ANTIGEN (FORMALDEHYDE INACTIVATED), INFLUENZA B VIRUS B/PHUKET/3073/2013 ANTIGEN (FORMALDEHYDE INACTIVATED), AND INFLUENZA B VIRUS B/MARYLAND/15/2016 BX-69A ANTIGEN (FORMALDEHYDE INACTIVATED) 0.5 ML: 15; 15; 15; 15 INJECTION, SUSPENSION INTRAMUSCULAR at 18:10

## 2019-11-12 RX ADMIN — DOCUSATE SODIUM 100 MG: 100 CAPSULE, LIQUID FILLED ORAL at 10:05

## 2019-11-12 RX ADMIN — OXYCODONE HYDROCHLORIDE 10 MG: 10 TABLET ORAL at 23:39

## 2019-11-12 RX ADMIN — ACETAMINOPHEN 1000 MG: 500 TABLET ORAL at 03:56

## 2019-11-12 RX ADMIN — Medication 125 ML/HR: at 01:16

## 2019-11-12 RX ADMIN — KETOROLAC TROMETHAMINE 30 MG: 30 INJECTION, SOLUTION INTRAMUSCULAR; INTRAVENOUS at 12:01

## 2019-11-12 RX ADMIN — KETOROLAC TROMETHAMINE 30 MG: 30 INJECTION, SOLUTION INTRAMUSCULAR; INTRAVENOUS at 06:05

## 2019-11-12 RX ADMIN — KETOROLAC TROMETHAMINE 30 MG: 30 INJECTION, SOLUTION INTRAMUSCULAR; INTRAVENOUS at 18:06

## 2019-11-12 RX ADMIN — KETOROLAC TROMETHAMINE 30 MG: 30 INJECTION, SOLUTION INTRAMUSCULAR; INTRAVENOUS at 00:07

## 2019-11-12 RX ADMIN — ACETAMINOPHEN 1000 MG: 500 TABLET ORAL at 10:05

## 2019-11-12 ASSESSMENT — PAIN SCALES - GENERAL: PAIN_LEVEL: 3

## 2019-11-12 NOTE — PROGRESS NOTES
Pt assisted to restroom with this RN, evelio provided guy catheter removed, ambulated to bed without difficulty.  Call light in place, encouraged to call with needs

## 2019-11-12 NOTE — ANESTHESIA PROCEDURE NOTES
Epidural Block  Date/Time: 11/11/2019 4:27 PM  Performed by: Aleyda Carcamo M.D.  Authorized by: Aleyda Carcamo M.D.     Patient Location:  OB  Start Time:  11/11/2019 4:27 PM  End Time:  11/11/2019 4:36 PM  Reason for Block: labor analgesia    patient identified, IV checked, site marked, risks and benefits discussed, surgical consent, monitors and equipment checked, pre-op evaluation and timeout performed    Patient Position:  Sitting  Prep: ChloraPrep, patient draped and sterile technique    Monitoring:  Blood pressure, continuous pulse oximetry and heart rate  Approach:  Midline  Location:  L2-L3  Injection Technique:  ELIO saline  Skin infiltration:  Lidocaine  Strength:  1%  Dose:  3ml  Needle Type:  Tuohy  Needle Gauge:  17 G  Needle Length:  3.5 in  Loss of resistance::  8  Catheter Size:  19 G  Catheter at Skin Depth:  14  Test Dose:  Lidocaine 1.5% with epinephrine 1-to-200,000  Test Dose Result:  Negative

## 2019-11-12 NOTE — PROGRESS NOTES
38.6    PT present with CO UC's. +FM, denies LOF, or VB.  sve 2-3/80/-2    1000 Report to Peyton, discussed tracing, orders to IV hydrate.    1120 RN at , PT very uncomfortable, SVE /BBOW.  Call to SMA Todd and tracing discussed, MD aware for late decelerations. PT will be admitted to labor.

## 2019-11-12 NOTE — CARE PLAN
Problem: Alteration in comfort related to surgical incision and/or after birth pains  Goal: Patient is able to ambulate, care for self and infant with acceptable pain level  Outcome: PROGRESSING AS EXPECTED  Goal: Patient verbalizes acceptable pain level  Outcome: PROGRESSING AS EXPECTED

## 2019-11-12 NOTE — ANESTHESIA POSTPROCEDURE EVALUATION
Patient: Maggy Solis    Procedure Summary     Date:  19 Room / Location:  [facility] OR 02 / LABOR AND DELIVERY    Anesthesia Start:   Anesthesia Stop:      Procedure:   SECTION, PRIMARY (Bilateral Abdomen) Diagnosis:        delivery delivered      ( Section Delivered)    Surgeon:  Ignacia Gonzalez M.D. Responsible Provider:  Aleyda Carcamo M.D.    Anesthesia Type:  epidural ASA Status:  3          Final Anesthesia Type: epidural  Last vitals  BP   Blood Pressure: 102/60    Temp   36.2 °C (97.2 °F)    Pulse   Pulse: 68   Resp   18    SpO2   95 %      Anesthesia Post Evaluation    Patient location during evaluation: bedside  Patient participation: complete - patient participated  Level of consciousness: awake and alert  Pain score: 3    Airway patency: patent  Anesthetic complications: no  Cardiovascular status: adequate  Respiratory status: acceptable  Hydration status: acceptable    PONV: none           Nurse Pain Score: 3 (NPRS)

## 2019-11-12 NOTE — PROGRESS NOTES
POD 0.5---      Afebrile, VS normal, UO adequate  no flatus yet, tolerating clear liquids  Well, denies N/V    LAB:      Results for KAREN URIAS (MRN [id number]) as of 2019 08:05   2019 10:55 2019 06:01   WBC 10.8 14.8 (H)   Hemoglobin 13.4 12.0   Hematocrit 38.0 35.4 (L)   Platelet Count 255 222       PE:     Lungs clear to auscultation  Abdomen soft, flat, bowel sounds present and normal  Wound dressing in place, waterproof barrier intact  Calves nontender, Pilo sign negative bilaterally  Back:  No CVA tenderness     PLAN: Postop care, advance diet as tolerated, increase activity as tolerated.

## 2019-11-12 NOTE — LACTATION NOTE
This note was copied from a baby's chart.  Baby 38.6 weeks, MOB Hx PCOS, baby in NBN for observation. Initiated pumping settings speed 80 decrease to 50-60 after 2 minutes, suction 40% x 15 minutes then hand express- watched Second Funnel hand expression video. Flange increased to 30.5 mm. Educated mother to pump every 2-3 hours or at least 8 or more times in 24 hours, pump log provided. Mother pumped drops on flange with first pump session. Lactation to follow as needed.

## 2019-11-12 NOTE — LACTATION NOTE
This note was copied from a baby's chart.  12:30 pm) baby returned to room RN requested LC help with latch. Baby showing some hunger cues, reinforced with mother hunger cue behaviors. Assisted baby to left breast using football hold, skin to skin, baby obtained deep latch- see latch assessment. Reinforced with mother if baby not vigorous at breast may still pump after breastfeed, for sub-optimal latch. May need to consider 3 step breastfeeding plan, baby has been in NBN with supplementation. Lactation to follow-up.     Teaching on hunger cues, breastfeeding when baby shows cues or by 3 hours from last feed, importance of skin to skin, positioning baby nipple to nose, cluster feeding & hand expression. Contact information for OP lactation support given, mother has [facility] [employer]- informed may get HG loaner pump through W[employer].

## 2019-11-12 NOTE — CARE PLAN
Problem: Alteration in comfort related to surgical incision and/or after birth pains  Goal: Patient is able to ambulate, care for self and infant with acceptable pain level  Intervention: Assess 0-10 pain level with vital signs  Note:   Pain controlled

## 2019-11-12 NOTE — CARE PLAN
Problem: Altered physiologic condition related to postoperative  delivery  Goal: Patient physiologically stable as evidenced by normal lochia, palpable uterine involution and vital signs within normal limits  Intervention: Massage fundus as necessary to prevent excessive lochia  Note:   Fundal massaged one with light bleeding

## 2019-11-12 NOTE — PROGRESS NOTES
H and P dictated    Afebrile  BP 120s/60s-70s  Epidural placed  Ctx Q 2-4 minutes    cx 4/90/-1  AROM---moderate to thick meconium  IUPC placed, FSE placed    FHR presently Cat I, baseline 160, moderate variability, 15 BPM accel with scalp stim.  Recently very low-dose pitocin was DCd because of Cat II tracing (lates).

## 2019-11-12 NOTE — ANESTHESIA PREPROCEDURE EVALUATION
Relevant Problems   No relevant active problems       Physical Exam    Airway   Mallampati: II  TM distance: >3 FB  Neck ROM: full       Cardiovascular - normal exam     Dental - normal exam         Pulmonary    Abdominal   (+) obese     Neurological - normal exam                 Anesthesia Plan    ASA 3   ASA physical status 3 criteria: morbid obesity - BMI greater than or equal to 40    Plan - epidural   Neuraxial block will be labor analgesia              Pertinent diagnostic labs and testing reviewed    Informed Consent:    Anesthetic plan and risks discussed with patient.

## 2019-11-12 NOTE — PROGRESS NOTES
"L and D progress note.    IUP at 38w6d.  Active labor.  Arrest of dilation and descent.  Abnormal fetal heart rate remote from delivery.  GBS negative.    S: Doing well.  Comfortable with epidural.  Discussed the fact that the fetal heart rate tracing demonstrates late decelerations and discussed the plan for cervical examination.  Upon examination, her cervix is unchanged and there is now swelling on the vertex.  Given the fact that her cervix is unchanged since earlier this morning, there is thick meconium stained fluid, and there is abnormal fetal heart rate remote from delivery, I recommend primary LTCS via pfannenstiel skin incision.  Discussed the risks, benefits, and alternatives of the procedure and she agrees to proceed.    O:  /69   Pulse 69   Temp 36.2 °C (97.1 °F) (Temporal)   Ht 1.651 m (5' 5\")   Wt 124.7 kg (275 lb)   SpO2 100%     A, A, and O x 3 NAD    SVE: 4/90%/-1/caput/molding.    FSE: Category II-III tracing.  Fetal heart rate baseline 130 bpm with repetitive late decelerations from the baseline to 100 bpm with accelerations to 150 bpm and periods of minimal variability.    IUPC: 150-170 mvus.  Every 3-7 minutes.      EFW: 3900 grams    Vertex    Recent Labs     11/11/19  1055   WBC 10.8   RBC 4.78   HEMOGLOBIN 13.4   HEMATOCRIT 38.0   MCV 79.5*   MCH 28.0   RDW 38.9   PLATELETCT 255   MPV 10.8   NEUTSPOLYS 79.40*   LYMPHOCYTES 13.80*   MONOCYTES 5.70   EOSINOPHILS 0.60   BASOPHILS 0.20     A/P: IUP at 38w6d.  Active labor.  Arrest of dilation and descent with meconium stained fluid and likely CPD along with abnormal fetal heart rate remote from delivery.    1.  To OR for primary LTCS via pfannenstiel skin incision.  2.  Risks, benefits, and alternatives discussed and she agrees to proceed.  3.  Cord gases at delivery.  4.  RT present for delivery.  "

## 2019-11-12 NOTE — OP REPORT
DATE OF SERVICE:  2019    PREOPERATIVE DIAGNOSES:  1.  Intrauterine pregnancy at 38 plus 6 weeks gestation.  2.  Active labor.  3.  Arrest of dilation and descent.  4.  Abnormal fetal heart rate, remote from delivery.  5.  Meconium stained fluid.  6.  Gestational hypertension.  7.  Group B streptococcus negative.  8.  Morbid obesity.    POSTOPERATIVE DIAGNOSES:  1.  Intrauterine pregnancy at 38 plus 6 weeks gestation.  2.  Active labor.  3.  Arrest of dilation and descent.  4.  Abnormal fetal heart rate, remote from delivery.  5.  Meconium stained fluid.  6.  Gestational hypertension.  7.  Group B streptococcus negative.  8.  Morbid obesity.    PROCEDURE PERFORMED:  Primary low transverse  section via Pfannenstiel   skin incision.    SURGEON:  Ignacia Gonzalez MD    ASSISTANT:  Alfa Champion MD    ANESTHESIOLOGIST:  Aleyda Carcamo MD    ANESTHESIA:  Epidural.    SPECIMEN:  Cord gases and placenta.    ESTIMATED BLOOD LOSS:  500 mL.    FINDINGS:  Viable male infant in the ROP position with Apgars of 2 at 1   minute, 6 at 5 minutes and 9 at 10 minutes.  Tight nuchal cord noted.  Thick   meconium-stained fluid encountered.  The DeLee and bulb suctioned after   delivery of the vertex.  The infant weighs 3290 grams, which is 7 pounds 4   ounces.  Normal bilateral tubes and ovaries.    Cord gases are as follows:  Arterial pH 6.95, base excess -16, venous pH 6.85,   base excess -15.    COMPLICATIONS:  None apparent.    INDICATIONS FOR THE PROCEDURE:  The patient was admitted as a 29-year-old    5, para 0-0-4-0 at 38 plus 6 weeks' gestation who presented to labor   and delivery with a report of regular and painful uterine contractions.    The patient was noted to be 2 cm dilated at initial presentation and she   walked for an hour and progressed to 4 cm dilated, 90% effaced, -1 station   that was at approximately 9:22 a.m.    The patient received an epidural for labor analgesia.  The patient  underwent   artificial rupture of membranes, meconium-stained fluid at 1910 hours.  At   approximately 2000 hours, I evaluated the fetal heart rate tracing and found   that it was baseline of the 130s with repetitive late decelerations from the   baseline of the 90s-100s with accelerations to 150 beats per minute and   periods of minimal variability.  She did have accelerations with scalp   stimulation.  On sterile vaginal exam, her cervix was 4 cm, 90% effaced, -1   station with caput and molding.  Given the arrest of descent and dilation   along with abnormal fetal heart rate, remote from delivery and the thick   meconium-stained fluid.  I recommended that the patient undergo a primary low   transverse  section via Pfannenstiel skin incision.    DETAILS OF THE PROCEDURE:  The patient was taken to the operating room where   her epidural anesthesia was rebolused.  The patient was then prepped and   draped in the dorsal supine position with leftward tilt.  The patient was then   prepped and draped in the dorsal supine position with leftward tilt.  Her   epidural anesthesia was found to be adequate.  A Pfannenstiel skin incision   was made with the scalpel.  The incision was carried down to the level of the   fascia.  The fascia was incised in the midline.  The fascial incision was   extended laterally with Harris scissors.  Areas of bleeding were cauterized with   electrocautery.  The rectus muscles were divided in the midline.  The   peritoneum was identified, grasped with hemostats, and entered sharply with   Metzenbaum scissors.  The incision was extended superiorly and inferiorly with   good visualization of the bladder.  The peritoneum was stretched.  The James   O retractor was placed in the patient's pelvis and abdomen.  The   vesicouterine peritoneum was identified, grasped with pickups, and entered   sharply with Metzenbaum scissors.  The incision was extended laterally with   Metzenbaum scissors and  the bladder flap was created digitally.  A low segment   transverse incision was made with the scalpel.  The incision was extended   laterally with blunt dissection.  The vertex of the infant was noted to be in   the ROP position.  It was rotated, flexed, and delivered through the incision.    The infant's mouth and nose were first bulb suction and the DeLee suctioned,   as thick meconium-stained fluid was encountered.  A tight nuchal cord was   noted and this was reduced.  The remainder of the infant was delivered without   difficulty.  The infant's cord was clamped and cut, and the infant was   quickly handed over to the awaiting nursing team.  A segment of cord was   clamped and cut for cord gas and the results are noted as above.    The placenta delivered intact with 3-vessel cord.  The uterus was cleared of   amniotic fluid, blood clots and membranes with moistened laparotomy sponges.    The uterine incision was grasped with ____ clamps and reapproximated with 0   Vicryl in a running locked fashion.  A second layer of the same suture was   used in a horizontal imbricating fashion for hemostasis and strength.  The   uterine incision was irrigated with sterile water and found to be hemostatic.    The bilateral tubes and ovaries were examined and found to be within normal   limits.  The uterine incision was examined again and found to be hemostatic.    The James O retractor was removed from the patient's pelvis and abdomen.  The   peritoneum was grasped with hemostats and reapproximated with 2-0 Vicryl in a   running fashion.  The rectus muscles were reapproximated with 2-0 Vicryl in a   vertical mattress suture fashion.  The subcutaneous tissues and fascia were   irrigated with sterile water and found to be hemostatic.  The rectus fascia   was reapproximated with 0 PDS.  The subcutaneous tissues were irrigated with   sterile water and areas of bleeding were cauterized with electrocautery.  The   subcutaneous  tissues were reapproximated in a 2-layer closure, first with 0   Vicryl on a CTX reapproximating Mariely's fascia and then with 3-0 Vicryl on a   CT to bring the skin edges in closer proximity.  The skin was closed with 4-0   Vicryl.    Bimanual uterine massage and exploration were performed and small clots   removed from the lower uterine segment.  The patient received 800 mcg of   Cytotec per rectum x1 after the above examination.  The patient received Ancef   IV and ampicillin IV during the procedure.  The patient was transferred to   the PACU in stable condition and her infant was transferred to the    nursery.    Sponge, lap, and needle counts were correct x2.       ____________________________________     JEANNE INMAN MD    HTA / NTS    DD:  2019 22:13:42  DT:  2019 22:48:16    D#:  [id number]  Job#:  [id number]    cc: GRAEME MAHONEY MD

## 2019-11-12 NOTE — PROGRESS NOTES
2130 Report received from MARIANELA German RN.     2200 Pt transferred to PACU 3 in stable condition report received from Dr Carcamo.     2315 Pt transferred to PP unit will all personal belongings and chart. Report given to CHARAN Granger. Infant in NBN for supplemental oxygen.

## 2019-11-12 NOTE — ANESTHESIA QCDR
2019 [facility] Clinical Data Registry (for Quality Improvement)     Postoperative nausea/vomiting risk protocol (Adult = 18 yrs and Pediatric 3-17 yrs)- (430 and 463)  General inhalation anesthetic (NOT TIVA) with PONV risk factors: No  Provision of anti-emetic therapy with at least 2 different classes of agents: N/A  Patient DID NOT receive anti-emetic therapy and reason is documented in Medical Record: N/A    Multimodal Pain Management- (AQI59)  Patient undergoing Elective Surgery (i.e. Outpatient, or ASC, or Prescheduled Surgery prior to Hospital Admission): No  Use of Multimodal Pain Management, two or more drugs and/or interventions, NOT including systemic opioids: N/A  Exception: Documented allergy to multiple classes of analgesics: N/A    PACU assessment of acute postoperative pain prior to Anesthesia Care End- Applies to Patients Age = 18- (ABG7)  Initial PACU pain score is which of the following: < 7/10  Patient unable to report pain score: N/A    Post-anesthetic transfer of care checklist/protocol to PACU/ICU- (426 and 427)  Upon conclusion of case, patient transferred to which of the following locations: PACU/Non-ICU  Use of transfer checklist/protocol: Yes  Exclusion: Service Performed in Patient Hospital Room (and thus did not require transfer): N/A    PACU Reintubation- (AQI31)  General anesthesia requiring endotracheal intubation (ETT) along with subsequent extubation in OR or PACU: No  Required reintubation in the PACU: N/A  Extubation was a planned trial documented in the medical record prior to removal of the original airway device: N/A    Unplanned admission to ICU related to anesthesia service up through end of PACU care- (MD51)  Unplanned admission to ICU (not initially anticipated at anesthesia start time): No

## 2019-11-12 NOTE — ANESTHESIA TIME REPORT
Anesthesia Start and Stop Event Times     Date Time Event    2019 1622 Ready for Procedure     1627 Anesthesia Start      Anesthesia Stop        Responsible Staff  19    Name Role Begin End    Aleyda Carcamo M.D. Anesth 1627 220        Preop Diagnosis (Free Text):  Pre-op Diagnosis     Fetal intolerance to labor, IUP 38.6        Preop Diagnosis (Codes):  Diagnosis Information     Diagnosis Code(s):  delivery delivered [O82]        Post op Diagnosis  Non-reassuring fetal heart tones complicating pregnancy, antepartum      Premium Reason  A. 3PM - 7AM    Comments:

## 2019-11-12 NOTE — OR SURGEON
Immediate Post OP Note    PreOp Diagnosis:     IUP at 38w6d.  Active labor.  Arrest of dilation and descent.  Abnormal fetal heart rate remote from delivery.  Meconium stained fluid.  Gestational hypertension.  GBS negative.  Morbid obesity.    PostOp Diagnosis:     As above.    Procedure(s):   SECTION, PRIMARY VIA PFANNENSTIEL SKIN INCISION.    Surgeon(s):  Ignacia Gonzalez M.D.    Assistant:  Alfa Champion MD    Anesthesiologist/Type of Anesthesia:  Dr. Carcamo/Epidural    Surgical Staff:  Circulator: (Unknown)  Scrub Person: (Unknown)    Specimens removed if any:  1. Cord gases.  2.  Placenta.    Estimated Blood Loss: 500 cc    Findings: viable male infant in the ROP position with APGARS of 2 at one minute, 6 at five minutes, and 9 at ten minutes.  Tight nuchal cord.  Thick meconium stained fluid.  Delee and bulb suctioned.  Weight - 3290 grams (7 pounds 4 ounces).  Normal bilateral tubes and bilateral ovaries.      Cord gases - pending.    Complications: None apparent.        2019 8:51 PM Ignacia Gonzalez M.D.

## 2019-11-12 NOTE — PROGRESS NOTES
6519 Admitted from L and D per [facility] with on going IV Pitocin, with guy catheter in placed, with sequential stocking bilaterally, Assessment done wound covered with Mepilex Silver clean and dry, Pt oriented to room, educated her to call or use her call light if she needs her nurse, Pt denies pain at this time, Encourage early mobilization, Admission care rendered.

## 2019-11-13 PROCEDURE — 700112 HCHG RX REV CODE 229: Performed by: OBSTETRICS & GYNECOLOGY

## 2019-11-13 PROCEDURE — 700102 HCHG RX REV CODE 250 W/ 637 OVERRIDE(OP): Performed by: OBSTETRICS & GYNECOLOGY

## 2019-11-13 PROCEDURE — 770002 HCHG ROOM/CARE - OB PRIVATE (112)

## 2019-11-13 PROCEDURE — A9270 NON-COVERED ITEM OR SERVICE: HCPCS | Performed by: OBSTETRICS & GYNECOLOGY

## 2019-11-13 RX ADMIN — IBUPROFEN 600 MG: 600 TABLET ORAL at 20:12

## 2019-11-13 RX ADMIN — OXYCODONE HYDROCHLORIDE AND ACETAMINOPHEN 1 TABLET: 10; 325 TABLET ORAL at 09:27

## 2019-11-13 RX ADMIN — SIMETHICONE CHEW TAB 80 MG 80 MG: 80 TABLET ORAL at 05:27

## 2019-11-13 RX ADMIN — IBUPROFEN 600 MG: 600 TABLET ORAL at 14:16

## 2019-11-13 RX ADMIN — IBUPROFEN 600 MG: 600 TABLET ORAL at 08:27

## 2019-11-13 RX ADMIN — OXYCODONE HYDROCHLORIDE AND ACETAMINOPHEN 1 TABLET: 5; 325 TABLET ORAL at 14:16

## 2019-11-13 RX ADMIN — DOCUSATE SODIUM 100 MG: 100 CAPSULE, LIQUID FILLED ORAL at 05:27

## 2019-11-13 RX ADMIN — OXYCODONE HYDROCHLORIDE AND ACETAMINOPHEN 1 TABLET: 10; 325 TABLET ORAL at 05:27

## 2019-11-13 RX ADMIN — DOCUSATE SODIUM 100 MG: 100 CAPSULE, LIQUID FILLED ORAL at 20:12

## 2019-11-13 RX ADMIN — OXYCODONE HYDROCHLORIDE AND ACETAMINOPHEN 1 TABLET: 10; 325 TABLET ORAL at 20:12

## 2019-11-13 RX ADMIN — IBUPROFEN 600 MG: 600 TABLET ORAL at 00:40

## 2019-11-13 RX ADMIN — VITAMIN A, VITAMIN C, VITAMIN D-3, VITAMIN E, VITAMIN B-1, VITAMIN B-2, NIACIN, VITAMIN B-6, CALCIUM, IRON, ZINC, COPPER 1 TABLET: 4000; 120; 400; 22; 1.84; 3; 20; 10; 1; 12; 200; 27; 25; 2 TABLET ORAL at 05:27

## 2019-11-13 NOTE — PROGRESS NOTES
2000 Pt doing well bonding with baby, Assessment done wound covered with Mepilex Silver clean and dry, Pt denies pain at this time, Encourage more ambulation, Needs attended.

## 2019-11-13 NOTE — CARE PLAN
Problem: Altered physiologic condition related to postoperative  delivery  Goal: Patient physiologically stable as evidenced by normal lochia, palpable uterine involution and vital signs within normal limits  Intervention: Massage fundus as necessary to prevent excessive lochia  Note:   Fundal massage done with light bleeding

## 2019-11-13 NOTE — CARE PLAN
Problem: Alteration in comfort related to surgical incision and/or after birth pains  Goal: Patient is able to ambulate, care for self and infant with acceptable pain level  Intervention: Assess 0-10 pain level with vital signs  11/12/2019 2209 by Bárbara Mahmood R.N.  Note:   Pain controlled

## 2019-11-13 NOTE — LACTATION NOTE
This note was copied from a baby's chart.  Physical assessment of baby and mother provided. Introduction to basics of initiating breastfeeding shown at this time to include posture, angle of latch, hand expression, skin to skin and normal  feeding patterns and expectations.    Encouraged to work on more asymmetry, demonstrated this. suggested sitting up in a chair. Baby tends to slide off towards the edge of the nipple as he tires.

## 2019-11-13 NOTE — PROGRESS NOTES
POD 2---      Afebrile, VS normal, UO adequate  + flatus, tolerating regular diet well, denies N/V    LAB:       2019 10:55 2019 06:01   WBC 10.8 14.8 (H)   Hemoglobin 13.4 12.0   Hematocrit 38.0 35.4 (L)   Platelet Count 255 222       PE:     Lungs clear to auscultation  Abdomen soft, flat, bowel sounds present and normal  Wound dressing in place, waterproof barrier intact  Calves nontender, Pilo sign negative bilaterally  Back:  No CVA tenderness     PLAN: Postop care, analgesia as needed, diet as tolerated,  activity as tolerated. Patient planning on discharge:  tomorrow .

## 2019-11-14 VITALS
DIASTOLIC BLOOD PRESSURE: 78 MMHG | WEIGHT: 275 LBS | OXYGEN SATURATION: 95 % | SYSTOLIC BLOOD PRESSURE: 125 MMHG | BODY MASS INDEX: 45.82 KG/M2 | TEMPERATURE: 98.5 F | HEART RATE: 102 BPM | RESPIRATION RATE: 18 BRPM | HEIGHT: 65 IN

## 2019-11-14 PROBLEM — G89.18 POSTOPERATIVE PAIN: Status: ACTIVE | Noted: 2019-11-14

## 2019-11-14 PROCEDURE — 700112 HCHG RX REV CODE 229: Performed by: OBSTETRICS & GYNECOLOGY

## 2019-11-14 PROCEDURE — A9270 NON-COVERED ITEM OR SERVICE: HCPCS | Performed by: OBSTETRICS & GYNECOLOGY

## 2019-11-14 PROCEDURE — 700102 HCHG RX REV CODE 250 W/ 637 OVERRIDE(OP): Performed by: OBSTETRICS & GYNECOLOGY

## 2019-11-14 RX ORDER — ACETAMINOPHEN 325 MG/1
325 TABLET ORAL EVERY 4 HOURS PRN
Qty: 30 TAB | Refills: 0 | COMMUNITY
Start: 2019-11-14 | End: 2020-12-17

## 2019-11-14 RX ORDER — IBUPROFEN 600 MG/1
600 TABLET ORAL EVERY 6 HOURS PRN
Qty: 20 TAB | Refills: 1 | Status: SHIPPED | OUTPATIENT
Start: 2019-11-14 | End: 2020-12-17

## 2019-11-14 RX ORDER — PSEUDOEPHEDRINE HCL 30 MG
100 TABLET ORAL 2 TIMES DAILY PRN
Qty: 60 CAP | COMMUNITY
Start: 2019-11-14 | End: 2020-12-17

## 2019-11-14 RX ORDER — OXYCODONE HYDROCHLORIDE AND ACETAMINOPHEN 5; 325 MG/1; MG/1
1 TABLET ORAL EVERY 6 HOURS PRN
Qty: 25 TAB | Refills: 0 | Status: SHIPPED | OUTPATIENT
Start: 2019-11-14 | End: 2019-11-21

## 2019-11-14 RX ORDER — SIMETHICONE 80 MG
80 TABLET,CHEWABLE ORAL 4 TIMES DAILY PRN
Qty: 30 TAB | Refills: 3 | COMMUNITY
Start: 2019-11-14 | End: 2020-12-17

## 2019-11-14 RX ADMIN — OXYCODONE HYDROCHLORIDE AND ACETAMINOPHEN 1 TABLET: 5; 325 TABLET ORAL at 02:25

## 2019-11-14 RX ADMIN — IBUPROFEN 600 MG: 600 TABLET ORAL at 13:15

## 2019-11-14 RX ADMIN — DOCUSATE SODIUM 100 MG: 100 CAPSULE, LIQUID FILLED ORAL at 13:15

## 2019-11-14 RX ADMIN — IBUPROFEN 600 MG: 600 TABLET ORAL at 02:25

## 2019-11-14 RX ADMIN — VITAMIN A, VITAMIN C, VITAMIN D-3, VITAMIN E, VITAMIN B-1, VITAMIN B-2, NIACIN, VITAMIN B-6, CALCIUM, IRON, ZINC, COPPER 1 TABLET: 4000; 120; 400; 22; 1.84; 3; 20; 10; 1; 12; 200; 27; 25; 2 TABLET ORAL at 06:34

## 2019-11-14 RX ADMIN — OXYCODONE HYDROCHLORIDE AND ACETAMINOPHEN 1 TABLET: 5; 325 TABLET ORAL at 17:58

## 2019-11-14 ASSESSMENT — EDINBURGH POSTNATAL DEPRESSION SCALE (EPDS)
THINGS HAVE BEEN GETTING ON TOP OF ME: YES, SOMETIMES I HAVEN'T BEEN COPING AS WELL AS USUAL
I HAVE BLAMED MYSELF UNNECESSARILY WHEN THINGS WENT WRONG: YES, SOME OF THE TIME
I HAVE BEEN ABLE TO LAUGH AND SEE THE FUNNY SIDE OF THINGS: AS MUCH AS I ALWAYS COULD
I HAVE FELT SAD OR MISERABLE: NOT VERY OFTEN
I HAVE LOOKED FORWARD WITH ENJOYMENT TO THINGS: AS MUCH AS I EVER DID
I HAVE BEEN ANXIOUS OR WORRIED FOR NO GOOD REASON: YES, SOMETIMES
I HAVE BEEN SO UNHAPPY THAT I HAVE BEEN CRYING: ONLY OCCASIONALLY
I HAVE FELT SCARED OR PANICKY FOR NO GOOD REASON: YES, SOMETIMES
THE THOUGHT OF HARMING MYSELF HAS OCCURRED TO ME: NEVER
I HAVE BEEN SO UNHAPPY THAT I HAVE HAD DIFFICULTY SLEEPING: NOT VERY OFTEN

## 2019-11-14 NOTE — PROGRESS NOTES
POD 3---      Pt. Wants to go home, minimal opioid use here.    Afebrile, VS normal, UO adequate  + flatus, tolerating regular diet well, denies N/V    LAB:       2019 10:55 2019 06:01   WBC 10.8 14.8 (H)   Hemoglobin 13.4 12.0   Hematocrit 38.0 35.4 (L)   Platelet Count 255 222         PE:     Lungs clear to auscultation  Abdomen soft, flat, bowel sounds present and normal  Wound dressing in place, waterproof barrier intact  Calves nontender, Pilo sign negative bilaterally  Back:  No CVA tenderness     PLAN: Postop care, analgesia as needed, diet as tolerated,  activity as tolerated. Patient planning on discharge:  today .    Prescriptions:   PNV, percocet 5/325 #25, ibuprofen 600 mg.  Followup plans:   2 weeks .

## 2019-11-14 NOTE — CARE PLAN
Problem: Potential for postpartum infection related to surgical incision, compromised uterine condition, urinary tract or respiratory compromise  Goal: Patient will be afebrile and free from signs and symptoms of infection  Outcome: PROGRESSING AS EXPECTED  Note:   Vitals stable and within parameters. Pt afebrile. No signs of infection noted on assessment.      Problem: Alteration in comfort related to surgical incision and/or after birth pains  Goal: Patient is able to ambulate, care for self and infant with acceptable pain level  Outcome: PROGRESSING AS EXPECTED  Note:   Patient ambulating without difficulty. No reports of dizziness or lightheadedness.

## 2019-11-14 NOTE — CARE PLAN
Problem: Potential knowledge deficit related to lack of understanding of self and  care  Goal: Patient will verbalize understanding of self and infant care  Outcome: PROGRESSING AS EXPECTED  Goal: Patient will demonstrate ability to care for self and infant  Outcome: PROGRESSING AS EXPECTED

## 2019-11-14 NOTE — PROGRESS NOTES
Assessment completed, fundus firm, lochia scant. ABD incision with mepilex silver dressing intact. POC reviewed, verbalized understanding. Denies pain at this time, will call if pain med intervention needed.   Breastfeeding with supplementation.  ---  1830 Consult with hospital  for discharge with a post partum depression screen of 11. See note. Patient agrees she feels safe to go home and has good support at home where she lives with her mother.  Resources provided.

## 2019-11-14 NOTE — PROGRESS NOTES
Assumed care of patient. Assessment complete. Fundus is firm, at the umbilicus, with light lochia rubra. Pt previously medicated my charge RN, no needs for pain at this time. Bed is locked and in low position. Call light left within reach and encouraged to call for any needs if necessary.

## 2019-11-14 NOTE — LACTATION NOTE
This note was copied from a baby's chart.  BREASTFEEDING DISCHARGE INSTRUCTIONS     Teaching Provided  Hand Expression Taught: (Gave mom Haresh U video information to hand express as needed. Mom will express if unbale to collect with electric pump)  Latch-on Techniques Taught: (placed baby in FB hold on L side with two pillow support, baby darwn in closely while sitting upright with relaxed shoulders, )  Milk Collection and Storage Taught: (mom will pump after feeds and set on counter until next feed)  Milk Making Process, Supply and Demand, and Emptying Taught: (demand feeds of ten or more in 24 hours, contiue 3 step plan until gaining weight and breast feeding has been established)  Positioning Techniques Taught: (mom in football hold with pillow support, blanket roll under breast for lift, baby able to latch well on his own)  Pumping Taught: (mom has no questions regarding pumping, cleaning understood after each feed, mom has personal pump at home, discussed double pumping)  Recognizing Feeding Cues Taught: (continue STS and observe for cues of hands to mouth, licking lips, rooting to breast)  Supplementation Taught: (follow up with supplement after every feeding, DBM given  as supplement, )

## 2019-11-15 NOTE — DISCHARGE INSTRUCTIONS
POSTPARTUM DISCHARGE INSTRUCTIONS FOR MOM    YOB: 1990   Age: 29 y.o.               Admit Date: 2019     Discharge Date: 2019  Attending Doctor:  Darnell Todd M.D.                  Allergies:  Patient has no known allergies.    Discharged to home by car. Discharged via walking, hospital escort: Yes.  Special equipment needed: Not Applicable  Belongings with: Personal  Be sure to schedule a follow-up appointment with your primary care doctor or any specialists as instructed.     Discharge Plan:   Diet Plan: Discussed  Activity Level: Discussed  Confirmed Follow up Appointment: Patient to Call and Schedule Appointment  Confirmed Symptoms Management: Discussed  Medication Reconciliation Updated: Yes  Influenza Vaccine Indication: Patient Refuses(Current Flu Vaccine)  Influenza Vaccine Given - only chart on this line when given: Influenza Vaccine Given (See MAR)    REASONS TO CALL YOUR OBSTETRICIAN:  1.   Persistent fever or shaking chills (Temperature higher than 100.4)  2.   Heavy bleeding (soaking more than 1 pad per hour); Passing clots  3.   Foul odor from vagina  4.   Mastitis (Breast infection; breast pain, chills, fever, redness)  5.   Urinary pain, burning or frequency  6.   Episiotomy infection  7.   Abdominal incision infection  8.   Severe depression longer than 24 hours    HAND WASHING  · Prior to handling the baby.  · Before breastfeeding or bottle feeding baby.  · After using the bathroom or changing the baby's diaper.    WOUND CARE  Ask your physician for additional care instructions.  In general:    ·  Incision:      · Keep clean and dry.    · Do NOT lift anything heavier than your baby for up to 6 weeks.    · There should not be any opening or pus.      VAGINAL CARE  · Nothing inside vagina for 6 weeks: no sexual intercourse, tampons or douching.  · Bleeding may continue for 2-4 weeks.  Amount may vary.    · Call your physician for heavy bleeding which means soaking  "more than 1 pad per hour    BIRTH CONTROL  · It is possible to become pregnant at any time after delivery and while breastfeeding.  · Plan to discuss a method of birth control with your physician at your follow up visit. visit.    DIET AND ELIMINATION  · Eating more fiber (bran cereal, fruits, and vegetables) and drinking plenty of fluids will help to avoid constipation.  · Urinary frequency after childbirth is normal.    POSTPARTUM BLUES  During the first few days after birth, you may experience a sense of the \"blues\" which may include impatience, irritability or even crying.  These feeling come and go quickly.  However, as many as 1 in 10 women experience emotional symptoms known as postpartum depression.    Postpartum depression:  May start as early as the second or third day after delivery or take several weeks or months to develop.  Symptoms of \"blues\" are present, but are more intense:  Crying spells; loss of appetite; feelings of hopelessness or loss of control; fear of touching the baby; over concern or no concern at all about the baby; little or no concern about your own appearance/caring for yourself; and/or inability to sleep or excessive sleeping.  Contact your physician if you are experiencing any of these symptoms.    Crisis Hotline:  · [place] Crisis Hotline:  5-986-GSTPTZD  Or 1-231.778.9756  · [place] Crisis Hotline:  1-624.629.4800  Or 680-767-1683    PREVENTING SHAKEN BABY:  If you are angry or stressed, PUT THE BABY IN THE CRIB, step away, take some deep breaths, and wait until you are calm to care for the baby.  DO NOT SHAKE THE BABY.  You are not alone, call a supporter for help.    · Crisis Call Center 24/7 crisis line 070-731-9890 or 1-278.275.8812  · You can also text them, text \"ANSWER\" to 264669    QUIT SMOKING/TOBACCO USE:  I understand the use of any tobacco products increases my chance of suffering from future heart disease and could cause other illnesses which may shorten my life. " Quitting the use of tobacco products is the single most important thing I can do to improve my health. For further information on smoking / tobacco cessation call a Toll Free Quit Line at 1-942.547.2005 (*National Cancer [place]) or 1-886.659.1697 (American Lung Association) or you can access the web based program at www.lungusa.org.    · [place] Tobacco Users Help Line:  (912) 986-5741       Toll Free: 1-506.764.4687  · Quit Tobacco Program [facility] (670)860-3318    DEPRESSION / SUICIDE RISK:  As you are discharged from this [facility], it is important to learn how to keep safe from harming yourself.    Recognize the warning signs:  · Abrupt changes in personality, positive or negative- including increase in energy   · Giving away possessions  · Change in eating patterns- significant weight changes-  positive or negative  · Change in sleeping patterns- unable to sleep or sleeping all the time   · Unwillingness or inability to communicate  · Depression  · Unusual sadness, discouragement and loneliness  · Talk of wanting to die  · Neglect of personal appearance   · Rebelliousness- reckless behavior  · Withdrawal from people/activities they love  · Confusion- inability to concentrate     If you or a loved one observes any of these behaviors or has concerns about self-harm, here's what you can do:  · Talk about it- your feelings and reasons for harming yourself  · Remove any means that you might use to hurt yourself (examples: pills, rope, extension cords, firearm)  · Get professional help from the community (Mental Health, Substance Abuse, psychological counseling)  · Do not be alone:Call your Safe Contact- someone whom you trust who will be there for you.  · Call your local CRISIS HOTLINE 821-5894 or 884-294-2672  · Call your local Children's Mobile Crisis Response Team [place] (994) 438-1998 or www.Vocal[employer]  · Call the toll free National Suicide Prevention Hotlines    · National Suicide Prevention Lifeline 617-923-AILF (9558)  · National Hope Line Network 800-SUICIDE (298-2995)    DISCHARGE SURVEY:  Thank you for choosing [facility].  We hope we provided you with very good care.  You may be receiving a survey in the mail.  Please fill it out.  Your opinion is valuable to us.    ADDITIONAL EDUCATIONAL MATERIALS GIVEN TO PATIENT:        My signature on this form indicates that:  1.  I have reviewed and understand the above information  2.  My questions regarding this information have been answered to my satisfaction.  3.  I have formulated a plan with my discharge nurse to obtain my prescribed medication for home.

## 2019-11-15 NOTE — PROGRESS NOTES
Discharge instructions reviewed, verbalized understanding, papers signed. Prescribed med scripts given, reviewed, verbalized understanding.

## 2019-11-15 NOTE — DISCHARGE PLANNING
LSW received page regarding Pt discharging     Pt is discharging home, LSW confirmed Pt lives with Mother and has good support systerm, LSW confirmed Pt has reliable ride home with friend and has no concern regarding discharge at this time. LSW sent resources to unit to be provided, Pt aware of counseling services in the area.     Pt has been cleared for discharge

## 2019-11-29 NOTE — DISCHARGE SUMMARY
DATE OF ADMISSION:  2019.    DATE OF DISCHARGE:  2019.    ADMISSION DIAGNOSES:  1.  38 and 6/7 week gestation.  2.  Labor.  3.  Obesity.  4.  Mild non-proteinuric gestational hypertension.  5.  Hemoglobin C heterozygote.    DISCHARGE DIAGNOSES:  1.  38 and 6/7 week gestation.  2.  Labor.  3.  Obesity.  4.  Mild non-proteinuric gestational hypertension.  5.  Hemoglobin C heterozygote.  6.  Secondary arrest of dilatation and descent.  7.  Nonreassuring fetal heart rate monitoring.  8.  Meconium.    PROCEDURES:  1.  Induction of labor.  2.  Primary low transverse  section.    COMPLICATIONS:  None.    BABY:  Male, 1-minute Apgar 2, 5-minute Apgar 6, 10-minute Apgar 9, weight   3290 g.    PRESCRIPTIONS:  1.  Prenatal vitamin daily.  2.  Percocet 5/325, #25 with no refills, p.r.n. severe pain.  3.  Ibuprofen p.r.n. pain.    This 29-year-old lady is now .  She presented to the hospital at term   with spontaneous labor and intact membranes.  She required oxytocin   augmentation, but our ability to use oxytocin effectively was limited by   intermittent nonreassuring fetal heart rate monitoring.  Amniotomy produced   thick meconium.  Her labor ultimately arrested at 4 cm dilatation, -1 station   and the fetal heart rate tracing become persistently nonreassuring, so my   covering colleague, Dr. Fung, proceeded with a  section.    Postoperatively, there were no complications.  The patient was afebrile.  She   had normal return of bowel function.  Her hemoglobin and hematocrit were 12.0   and 35.4.  She and the baby went home on postoperative day #3 in satisfactory   condition.       ____________________________________     MD BEBETO MUNIZ / RUCHI    DD:  2019 13:22:43  DT:  2019 16:01:17    D#:  [id number]  Job#:  [id number]

## 2020-03-13 ENCOUNTER — NON-PROVIDER VISIT (OUTPATIENT)
Dept: URGENT CARE | Facility: PHYSICIAN GROUP | Age: 30
End: 2020-03-13

## 2020-03-13 DIAGNOSIS — Z11.1 PPD SCREENING TEST: ICD-10-CM

## 2020-03-13 PROCEDURE — 86580 TB INTRADERMAL TEST: CPT | Performed by: PHYSICIAN ASSISTANT

## 2020-03-14 NOTE — NON-PROVIDER
Maggy Solis is a 30 y.o. female here for a non-provider visit for PPD placement -- Step 1 of 2    Reason for PPD:  work requirement    1. TB evaluation questionnaire completed by patient? Yes      -  If any answers marked yes did you contact a provider prior to placing? Not Indicated  2.  Patient notified to return to clinic for reading on: 15th-16th  3.  PPD Placement documentation completed on TB evaluation questionnaire? Yes  4.  Location of TB evaluation questionnaire filed: front office

## 2020-03-16 ENCOUNTER — NON-PROVIDER VISIT (OUTPATIENT)
Dept: URGENT CARE | Facility: PHYSICIAN GROUP | Age: 30
End: 2020-03-16

## 2020-03-16 LAB — TB WHEAL 3D P 5 TU DIAM: 0 MM

## 2020-03-16 NOTE — NON-PROVIDER
Maggy Solis is a 30 y.o. female here for a non-provider visit for PPD reading -- Step 1 of 2.      1.  Resulted in Epic under enter/edit results? Yes   2.  TB evaluation questionnaire scanned into chart and original given to patient?Yes      3. Was induration greater than 0 mm? No.    If Step 1 of 2, when is patient returning for second step (delete if N/A): 03/20/20    Routed to PCP? Yes

## 2020-03-21 ENCOUNTER — NON-PROVIDER VISIT (OUTPATIENT)
Dept: URGENT CARE | Facility: PHYSICIAN GROUP | Age: 30
End: 2020-03-21

## 2020-03-21 ENCOUNTER — OFFICE VISIT (OUTPATIENT)
Dept: URGENT CARE | Facility: PHYSICIAN GROUP | Age: 30
End: 2020-03-21

## 2020-03-21 VITALS
OXYGEN SATURATION: 96 % | SYSTOLIC BLOOD PRESSURE: 126 MMHG | TEMPERATURE: 98 F | RESPIRATION RATE: 16 BRPM | DIASTOLIC BLOOD PRESSURE: 70 MMHG | HEART RATE: 84 BPM

## 2020-03-21 DIAGNOSIS — Z02.1 PRE-EMPLOYMENT EXAMINATION: ICD-10-CM

## 2020-03-21 DIAGNOSIS — Z11.1 PPD SCREENING TEST: ICD-10-CM

## 2020-03-21 PROCEDURE — 86580 TB INTRADERMAL TEST: CPT | Performed by: INTERNAL MEDICINE

## 2020-03-21 ASSESSMENT — ENCOUNTER SYMPTOMS
CHILLS: 0
FEVER: 0

## 2020-03-21 NOTE — NON-PROVIDER
Maggy Solis is a 30 y.o. female here for a non-provider visit for PPD placement -- Step 2 of 2    Reason for PPD:  work requirement    1. TB evaluation questionnaire completed by patient? Yes      -  If any answers marked yes did you contact a provider prior to placing? Not Indicated  2.  Patient notified to return to clinic for reading on: 23-24  3.  PPD Placement documentation completed on TB evaluation questionnaire? Yes  4.  Location of TB evaluation questionnaire filed: front office

## 2020-03-22 NOTE — PROGRESS NOTES
Subjective:      Maggy Solis is a 30 y.o. female who presents with Employment Physical (pre-employment physical)            HPI  Maggy is here for pre-employment physical. No physical and health questionnaire provided. No PMH/FH congenital cardiac problems or early cardiac death before age of 50. Denies SOB, CP or dizziness on exertion. Denies h/o asthma, seizures, HAs, head traumas/concussions. No h/o rashes/eczema. No h/o of chronic back or extremity pain. Denies numbness/tingling of extremities. Denies h/o CP/pressure.Takes no OTC or prescribed meds.  . Exam normal. No trauma, injury or surgeries. Does not wear corrective glasses/lens. 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    PMH:  has a past medical history of PCO (polycystic ovaries) (2012) and Sickle cell trait (HCC) (2012).  MEDS:   Current Outpatient Medications:   •  acetaminophen (TYLENOL) 325 MG Tab, Take 1 Tab by mouth every four hours as needed., Disp: 30 Tab, Rfl: 0  •  docusate sodium 100 MG Cap, Take 100 mg by mouth 2 times a day as needed for Constipation., Disp: 60 Cap, Rfl:   •  ibuprofen (MOTRIN) 600 MG Tab, Take 1 Tab by mouth every 6 hours as needed for Mild Pain or Moderate Pain. (Patient not taking: Reported on 3/21/2020), Disp: 20 Tab, Rfl: 1  •  simethicone (MYLICON) 80 MG Chew Tab, Take 1 Tab by mouth 4 times a day as needed (for Abdominal Distention)., Disp: 30 Tab, Rfl: 3  •  Prenatal Vit-Fe Fumarate-FA (PRENATAL 19 PO), Take  by mouth., Disp: , Rfl:   ALLERGIES: No Known Allergies  SURGHX:   Past Surgical History:   Procedure Laterality Date   • PB  DELIVERY ONLY Bilateral 2019    Procedure:  SECTION, PRIMARY;  Surgeon: Ignacia Gonzalez M.D.;  Location: LABOR AND DELIVERY;  Service: Labor and Delivery     SOCHX:  reports that she is a non-smoker but has been exposed to tobacco smoke. She has been exposed to 0.00 packs per day. She has never used  smokeless tobacco. She reports current alcohol use of about 2.0 oz of alcohol per week. She reports that she does not use drugs.  FH: Family history was reviewed, no pertinent findings to report      Review of Systems   Constitutional: Negative for chills, fever and malaise/fatigue.   All other systems reviewed and are negative.         Objective:     /70 (BP Location: Left arm, Patient Position: Sitting, BP Cuff Size: Adult)   Pulse 84   Temp 36.7 °C (98 °F) (Temporal)   Resp 16   SpO2 96%      Physical Exam  Vitals signs reviewed.   Constitutional:       General: She is awake. She is not in acute distress.     Appearance: Normal appearance. She is well-developed and well-groomed. She is morbidly obese. She is not ill-appearing, toxic-appearing or diaphoretic.   HENT:      Head: Normocephalic.   Eyes:      Extraocular Movements: Extraocular movements intact.      Pupils: Pupils are equal, round, and reactive to light.   Neck:      Musculoskeletal: Normal range of motion and neck supple.   Cardiovascular:      Rate and Rhythm: Normal rate and regular rhythm.      Heart sounds: Normal heart sounds, S1 normal and S2 normal.   Pulmonary:      Effort: Pulmonary effort is normal.      Breath sounds: Normal breath sounds and air entry.   Musculoskeletal: Normal range of motion.   Skin:     General: Skin is warm and dry.   Neurological:      General: No focal deficit present.      Mental Status: She is alert and oriented to person, place, and time.      GCS: GCS eye subscore is 4. GCS verbal subscore is 5. GCS motor subscore is 6.      Cranial Nerves: Cranial nerves are intact.      Sensory: Sensation is intact.      Motor: Motor function is intact.      Coordination: Coordination is intact.      Gait: Gait is intact.   Psychiatric:         Attention and Perception: Attention and perception normal.         Mood and Affect: Mood and affect normal.         Speech: Speech normal.         Behavior: Behavior normal.  Behavior is cooperative.         Thought Content: Thought content normal.         Cognition and Memory: Cognition and memory normal.         Judgment: Judgment normal.                 Assessment/Plan:       1. Pre-employment examination

## 2020-03-23 ENCOUNTER — NON-PROVIDER VISIT (OUTPATIENT)
Dept: URGENT CARE | Facility: PHYSICIAN GROUP | Age: 30
End: 2020-03-23

## 2020-03-23 LAB — TB WHEAL 3D P 5 TU DIAM: 0 MM

## 2020-03-23 NOTE — NON-PROVIDER
Maggy Solis is a 30 y.o. female here for a non-provider visit for PPD reading -- Step 2 of 2.      1.  Resulted in Epic under enter/edit results? Yes   2.  TB evaluation questionnaire scanned into chart and original given to patient?Yes      3. Was induration greater than 0 mm? No.        Routed to PCP? Yes

## 2020-12-17 ENCOUNTER — HOSPITAL ENCOUNTER (OUTPATIENT)
Facility: MEDICAL CENTER | Age: 30
End: 2020-12-17
Attending: FAMILY MEDICINE
Payer: COMMERCIAL

## 2020-12-17 ENCOUNTER — OFFICE VISIT (OUTPATIENT)
Dept: URGENT CARE | Facility: PHYSICIAN GROUP | Age: 30
End: 2020-12-17
Payer: COMMERCIAL

## 2020-12-17 VITALS
SYSTOLIC BLOOD PRESSURE: 126 MMHG | RESPIRATION RATE: 16 BRPM | TEMPERATURE: 98 F | OXYGEN SATURATION: 98 % | HEART RATE: 70 BPM | DIASTOLIC BLOOD PRESSURE: 80 MMHG

## 2020-12-17 DIAGNOSIS — N89.8 VAGINAL DISCHARGE: ICD-10-CM

## 2020-12-17 PROCEDURE — 87510 GARDNER VAG DNA DIR PROBE: CPT

## 2020-12-17 PROCEDURE — 87660 TRICHOMONAS VAGIN DIR PROBE: CPT

## 2020-12-17 PROCEDURE — 99213 OFFICE O/P EST LOW 20 MIN: CPT | Performed by: FAMILY MEDICINE

## 2020-12-17 PROCEDURE — 87491 CHLMYD TRACH DNA AMP PROBE: CPT

## 2020-12-17 PROCEDURE — 87480 CANDIDA DNA DIR PROBE: CPT

## 2020-12-17 PROCEDURE — 87591 N.GONORRHOEAE DNA AMP PROB: CPT

## 2020-12-17 RX ORDER — LISINOPRIL AND HYDROCHLOROTHIAZIDE 20; 12.5 MG/1; MG/1
1 TABLET ORAL
COMMUNITY
Start: 2020-12-02 | End: 2022-07-04

## 2020-12-17 RX ORDER — FAMOTIDINE 20 MG/1
TABLET, FILM COATED ORAL
COMMUNITY
Start: 2020-06-23 | End: 2022-07-04

## 2020-12-17 RX ORDER — AMLODIPINE BESYLATE 5 MG/1
5 TABLET ORAL
COMMUNITY
Start: 2020-11-20 | End: 2022-07-04

## 2020-12-17 RX ORDER — METFORMIN HYDROCHLORIDE 500 MG/1
TABLET, EXTENDED RELEASE ORAL
COMMUNITY
Start: 2020-11-24 | End: 2022-07-04

## 2020-12-18 LAB
CANDIDA DNA VAG QL PROBE+SIG AMP: NEGATIVE
G VAGINALIS DNA VAG QL PROBE+SIG AMP: NEGATIVE
T VAGINALIS DNA VAG QL PROBE+SIG AMP: NEGATIVE

## 2020-12-18 NOTE — PROGRESS NOTES
Subjective:      Maggy Solis is a 30 y.o. female who presents with Vaginal Discharge (itchy,discomfort,x 2 days)            This is a new problem.  30-year-old presents for evaluation of vaginal discharge and itching for the past couple days.  Denies any nausea vomiting, fever chills, back pain or flank pain or abdominal pain.  Had trichomonas last month.  Sexually active with multiple partners.      Review of Systems   All other systems reviewed and are negative.         Objective:     /80 (BP Location: Right arm, Patient Position: Sitting, BP Cuff Size: Large adult)   Pulse 70   Temp 36.7 °C (98 °F) (Temporal)   Resp 16   SpO2 98%      Physical Exam  Constitutional:       General: She is not in acute distress.     Appearance: She is not ill-appearing, toxic-appearing or diaphoretic.   HENT:      Head: Normocephalic and atraumatic.      Right Ear: External ear normal.      Left Ear: External ear normal.   Cardiovascular:      Rate and Rhythm: Normal rate.   Pulmonary:      Effort: Pulmonary effort is normal. No respiratory distress.      Breath sounds: No stridor.   Genitourinary:     Comments: Self collected vaginal swab  Skin:     Coloration: Skin is not jaundiced or pale.   Neurological:      Mental Status: She is alert and oriented to person, place, and time.   Psychiatric:         Mood and Affect: Mood normal.                 Assessment/Plan:        1. Vaginal discharge  - VAGINAL PATHOGENS DNA PANEL; Future  - CHLAMYDIA/GC PCR URINE OR SWAB; Future    .  Treatment pending results  Warning signs reviewed

## 2021-03-14 ENCOUNTER — HOSPITAL ENCOUNTER (EMERGENCY)
Facility: MEDICAL CENTER | Age: 31
End: 2021-03-15
Attending: EMERGENCY MEDICINE
Payer: COMMERCIAL

## 2021-03-14 DIAGNOSIS — E66.01 MORBID OBESITY (HCC): ICD-10-CM

## 2021-03-14 DIAGNOSIS — Z33.1 PREGNANT STATE, INCIDENTAL: ICD-10-CM

## 2021-03-14 DIAGNOSIS — G43.101 MIGRAINE WITH AURA AND WITH STATUS MIGRAINOSUS, NOT INTRACTABLE: ICD-10-CM

## 2021-03-14 LAB — HCG SERPL QL: POSITIVE

## 2021-03-14 PROCEDURE — 36415 COLL VENOUS BLD VENIPUNCTURE: CPT

## 2021-03-14 PROCEDURE — 99284 EMERGENCY DEPT VISIT MOD MDM: CPT

## 2021-03-14 PROCEDURE — 96374 THER/PROPH/DIAG INJ IV PUSH: CPT

## 2021-03-14 PROCEDURE — 700111 HCHG RX REV CODE 636 W/ 250 OVERRIDE (IP): Performed by: EMERGENCY MEDICINE

## 2021-03-14 PROCEDURE — 96375 TX/PRO/DX INJ NEW DRUG ADDON: CPT

## 2021-03-14 PROCEDURE — 84703 CHORIONIC GONADOTROPIN ASSAY: CPT

## 2021-03-14 RX ORDER — DIPHENHYDRAMINE HYDROCHLORIDE 50 MG/ML
50 INJECTION INTRAMUSCULAR; INTRAVENOUS ONCE
Status: COMPLETED | OUTPATIENT
Start: 2021-03-14 | End: 2021-03-14

## 2021-03-14 RX ORDER — PROCHLORPERAZINE EDISYLATE 5 MG/ML
10 INJECTION INTRAMUSCULAR; INTRAVENOUS ONCE
Status: COMPLETED | OUTPATIENT
Start: 2021-03-14 | End: 2021-03-14

## 2021-03-14 RX ORDER — KETOROLAC TROMETHAMINE 30 MG/ML
30 INJECTION, SOLUTION INTRAMUSCULAR; INTRAVENOUS ONCE
Status: COMPLETED | OUTPATIENT
Start: 2021-03-14 | End: 2021-03-14

## 2021-03-14 RX ADMIN — KETOROLAC TROMETHAMINE 30 MG: 30 INJECTION, SOLUTION INTRAMUSCULAR at 23:43

## 2021-03-14 RX ADMIN — PROCHLORPERAZINE EDISYLATE 10 MG: 5 INJECTION INTRAMUSCULAR; INTRAVENOUS at 23:43

## 2021-03-14 RX ADMIN — DIPHENHYDRAMINE HYDROCHLORIDE 50 MG: 50 INJECTION, SOLUTION INTRAMUSCULAR; INTRAVENOUS at 23:22

## 2021-03-15 VITALS
OXYGEN SATURATION: 98 % | SYSTOLIC BLOOD PRESSURE: 137 MMHG | TEMPERATURE: 97.5 F | WEIGHT: 284.39 LBS | HEART RATE: 70 BPM | DIASTOLIC BLOOD PRESSURE: 76 MMHG | BODY MASS INDEX: 47.33 KG/M2 | RESPIRATION RATE: 16 BRPM

## 2021-03-15 RX ORDER — ONDANSETRON 4 MG/1
4 TABLET, ORALLY DISINTEGRATING ORAL EVERY 6 HOURS PRN
Qty: 10 TABLET | Refills: 0 | Status: SHIPPED | OUTPATIENT
Start: 2021-03-15 | End: 2022-07-04

## 2021-03-15 NOTE — ED NOTES
Pt c/o pain at IV site w/ Benadryl administration.  Ice pack provided.  Will hold compazine and toradol, for now.

## 2021-03-15 NOTE — ED PROVIDER NOTES
CHIEF COMPLAINT  Chief Complaint   Patient presents with   • Migraine       HPI  Maggy Solis is a 31 y.o. female who presents tonight with a chief complaint of persistent migraine headache on the right side of her head since Thursday.  She states she has tried over-the-counter Excedrin Migraine with no relief.  She has had some nausea with vomiting.  She denies any fever, chills, head trauma, neck pain.  She has had headaches off and on for many years but has never been on any specific migraine medications that have worked.  She denies any blurred or double vision.  She has had no other neuro deficits including distal weakness or numbness.    REVIEW OF SYSTEMS  See HPI for further details. All other system reviews are negative.    PAST MEDICAL HISTORY  Past Medical History:   Diagnosis Date   • PCO (polycystic ovaries) 4/5/2012   • Sickle cell trait (HCC) 4/5/2012       FAMILY HISTORY  Family History   Problem Relation Age of Onset   • Diabetes Mother        SOCIAL HISTORY  Social History     Socioeconomic History   • Marital status: Single     Spouse name: Not on file   • Number of children: Not on file   • Years of education: Not on file   • Highest education level: Not on file   Occupational History   • Not on file   Tobacco Use   • Smoking status: Passive Smoke Exposure - Never Smoker   • Smokeless tobacco: Never Used   • Tobacco comment: both parents smoked and boyfriend smokes   Substance and Sexual Activity   • Alcohol use: Yes     Alcohol/week: 2.0 oz     Types: 4 Shots of liquor per week   • Drug use: No   • Sexual activity: Yes     Partners: Male   Other Topics Concern   • Not on file   Social History Narrative   • Not on file     Social Determinants of Health     Financial Resource Strain:    • Difficulty of Paying Living Expenses:    Food Insecurity:    • Worried About Running Out of Food in the Last Year:    • Ran Out of Food in the Last Year:    Transportation Needs:    • Lack of Transportation  (Medical):    • Lack of Transportation (Non-Medical):    Physical Activity:    • Days of Exercise per Week:    • Minutes of Exercise per Session:    Stress:    • Feeling of Stress :    Social Connections:    • Frequency of Communication with Friends and Family:    • Frequency of Social Gatherings with Friends and Family:    • Attends Christian Services:    • Active Member of Clubs or Organizations:    • Attends Club or Organization Meetings:    • Marital Status:    Intimate Partner Violence:    • Fear of Current or Ex-Partner:    • Emotionally Abused:    • Physically Abused:    • Sexually Abused:        SURGICAL HISTORY  Past Surgical History:   Procedure Laterality Date   • PB  DELIVERY ONLY Bilateral 2019    Procedure:  SECTION, PRIMARY;  Surgeon: Ignacia Gonzalez M.D.;  Location: LABOR AND DELIVERY;  Service: Labor and Delivery       CURRENT MEDICATIONS  See nurses notes    ALLERGIES  No Known Allergies    PHYSICAL EXAM  VITAL SIGNS: /76   Pulse 70   Temp 36.4 °C (97.5 °F) (Temporal)   Resp 16   Wt (!) 129 kg (284 lb 6.3 oz)   LMP 2021   SpO2 98%   BMI 47.33 kg/m²     Constitutional: Patient is well developed, well nourished in mild distress.  HENT: Normocephalic, atraumatic, moist oral mucosa.  Eyes: PERRL, EOMI, Conjunctiva without erythema  Neck: Supple Normal range of motion , no tenderness, no rigidity.  Cardiovascular: Normal heart rate and rhythm. No murmur  Thorax & Lungs: Clear and equal breath sounds with good excursion. No respiratory distress  Abdomen: Bowel sounds normal in all four quadrants. Soft, obese, nontender.  Skin: Warm, Dry   Back: No cervical, thoracic, or lumbosacral tenderness.  Extremities: Peripheral pulses 4/4 No edema, No tenderness  Neurologic: Alert & oriented x 3, Normal motor function, Normal sensory function, No lateralizing or focal deficits noted. DTR's 4/4 bilaterally.  Psychiatric: Affect normal          Results for orders  placed or performed during the hospital encounter of 03/14/21   HCG QUAL SERUM   Result Value Ref Range    Beta-Hcg Qualitative Serum Positive (A) Negative         COURSE & MEDICAL DECISION MAKING  Pertinent Labs & Imaging studies reviewed. (See chart for details)  Patient was treated with a Hep-Lock IV with Toradol, Benadryl, Compazine and her headache was markedly improved.  We did a pregnancy test and it came back positive.  Patient was notified of these results and will follow accordingly outpatient with the pregnancy center.  In the meantime I told her that she can only take Tylenol for her headaches, she is to rest, increase fluids, follow-up sooner than later because of her high blood pressure as this could cause potential problems with pregnancy and eclampsia.  She is discharged in stable and improved condition.    FINAL IMPRESSION  1.  Incidental pregnant state  2.  Migraine headache  3.  Morbid obesity         Electronically signed by: Reny Aguirre D.O., 3/15/2021 2:36 [facility] Provider Note

## 2021-03-15 NOTE — DISCHARGE INSTRUCTIONS
Call this week to schedule an appointment with OB  Take prenatal vitamins  Tylenol only for headaches  You will need to be followed closely as you do have high blood pressure and this can be a serious complication with pregnancy  Take the nausea medications as needed, they have been sent to your pharmacy.

## 2021-03-15 NOTE — ED NOTES
Discharge instructions provided.  Pt verbalized the understanding of discharge instructions to follow up with PCP/OB and to return to ER if condition worsens.  Pt ambulated out of ER without difficulty.

## 2022-07-04 ENCOUNTER — OFFICE VISIT (OUTPATIENT)
Dept: URGENT CARE | Facility: PHYSICIAN GROUP | Age: 32
End: 2022-07-04
Payer: COMMERCIAL

## 2022-07-04 ENCOUNTER — HOSPITAL ENCOUNTER (OUTPATIENT)
Facility: MEDICAL CENTER | Age: 32
End: 2022-07-04
Attending: FAMILY MEDICINE
Payer: COMMERCIAL

## 2022-07-04 VITALS
HEIGHT: 65 IN | HEART RATE: 67 BPM | BODY MASS INDEX: 35.99 KG/M2 | WEIGHT: 216 LBS | DIASTOLIC BLOOD PRESSURE: 72 MMHG | SYSTOLIC BLOOD PRESSURE: 130 MMHG | TEMPERATURE: 97.4 F | OXYGEN SATURATION: 97 % | RESPIRATION RATE: 16 BRPM

## 2022-07-04 DIAGNOSIS — N89.8 VAGINA ITCHING: ICD-10-CM

## 2022-07-04 DIAGNOSIS — Z72.51 HIGH RISK HETEROSEXUAL BEHAVIOR: ICD-10-CM

## 2022-07-04 PROBLEM — E78.5 HYPERLIPIDEMIA: Status: ACTIVE | Noted: 2020-06-01

## 2022-07-04 LAB
APPEARANCE UR: NORMAL
BILIRUB UR STRIP-MCNC: NEGATIVE MG/DL
C TRACH DNA GENITAL QL NAA+PROBE: NEGATIVE
CANDIDA DNA VAG QL PROBE+SIG AMP: NEGATIVE
COLOR UR AUTO: NORMAL
G VAGINALIS DNA VAG QL PROBE+SIG AMP: NEGATIVE
GLUCOSE UR STRIP.AUTO-MCNC: NEGATIVE MG/DL
INT CON NEG: NEGATIVE
INT CON POS: POSITIVE
KETONES UR STRIP.AUTO-MCNC: NEGATIVE MG/DL
LEUKOCYTE ESTERASE UR QL STRIP.AUTO: NORMAL
N GONORRHOEA DNA GENITAL QL NAA+PROBE: NEGATIVE
NITRITE UR QL STRIP.AUTO: NEGATIVE
PH UR STRIP.AUTO: 6 [PH] (ref 5–8)
POC URINE PREGNANCY TEST: NEGATIVE
PROT UR QL STRIP: NEGATIVE MG/DL
RBC UR QL AUTO: NORMAL
SP GR UR STRIP.AUTO: 1.03
SPECIMEN SOURCE: NORMAL
T VAGINALIS DNA VAG QL PROBE+SIG AMP: NEGATIVE
UROBILINOGEN UR STRIP-MCNC: 1 MG/DL

## 2022-07-04 PROCEDURE — 87086 URINE CULTURE/COLONY COUNT: CPT

## 2022-07-04 PROCEDURE — 81025 URINE PREGNANCY TEST: CPT | Performed by: FAMILY MEDICINE

## 2022-07-04 PROCEDURE — 81002 URINALYSIS NONAUTO W/O SCOPE: CPT | Performed by: FAMILY MEDICINE

## 2022-07-04 PROCEDURE — 87660 TRICHOMONAS VAGIN DIR PROBE: CPT

## 2022-07-04 PROCEDURE — 87491 CHLMYD TRACH DNA AMP PROBE: CPT

## 2022-07-04 PROCEDURE — 99214 OFFICE O/P EST MOD 30 MIN: CPT | Performed by: FAMILY MEDICINE

## 2022-07-04 PROCEDURE — 87480 CANDIDA DNA DIR PROBE: CPT

## 2022-07-04 PROCEDURE — 87510 GARDNER VAG DNA DIR PROBE: CPT

## 2022-07-04 PROCEDURE — 87591 N.GONORRHOEAE DNA AMP PROB: CPT

## 2022-07-04 RX ORDER — ETONOGESTREL AND ETHINYL ESTRADIOL VAGINAL RING .015; .12 MG/D; MG/D
1 RING VAGINAL
COMMUNITY
End: 2023-05-02

## 2022-07-04 NOTE — PROGRESS NOTES
"  Subjective:      32 y.o. female presents to urgent care for vaginal problems that have been present intermittently for over a month.  She is experiencing vaginal itching that is constant, but worse at night.  She also has pain with intercourse.  She has had a lot of gynecological issues recently.  She started a miscarriage 2022.  She did have to take two \" pills\" to help complete this process.  She tested positive for chlamydia 6/3/2022.  She was treated for both chlamydia and gonorrhea.  She had repeat testing 2022 and was negative for both gonorrhea and chlamydia.  BV at that time was undetermined so she was treated with a course of Flagyl which she completed 2022.  Unfortunately symptoms continue to persist which is why she is here today.  She denies any changes to urinary urgency, frequency, dysuria, or hematuria.  Bowel movements remain regular and soft.  She is currently sexually active with multiple, male partners, and uses NuvaRing for contraception, but no barrier contraception.  She has history of prior gonorrhea, chlamydia, and trichomonas.    She denies any other questions or concerns at this time.    Current problem list, medication, and past medical/surgical history were reviewed in Epic.    ROS  See HPI     Objective:      /72 (BP Location: Left arm, Patient Position: Sitting, BP Cuff Size: Large adult)   Pulse 67   Temp 36.3 °C (97.4 °F) (Temporal)   Resp 16   Ht 1.651 m (5' 5\")   Wt 98 kg (216 lb)   SpO2 97%   BMI 35.94 kg/m²     Physical Exam  Constitutional:       General: She is not in acute distress.     Appearance: She is not diaphoretic.   Cardiovascular:      Rate and Rhythm: Normal rate and regular rhythm.      Heart sounds: Normal heart sounds.   Pulmonary:      Effort: Pulmonary effort is normal. No respiratory distress.      Breath sounds: Normal breath sounds.   Abdominal:      General: Bowel sounds are normal.      Palpations: Abdomen is soft.      " Tenderness: There is no abdominal tenderness. There is no right CVA tenderness or left CVA tenderness.   Neurological:      Mental Status: She is alert.   Psychiatric:         Mood and Affect: Affect normal.         Judgment: Judgment normal.       Assessment/Plan:     1. High risk heterosexual behavior  2. Vagina itching  hCG negative.  Urinalysis is not within normal range, but only minimally abnormal.  We will send urine culture and treat based off that.  Patient self swab for gonorrhea, chlamydia, and DNA vaginal pathogens.  Lab requisition has been given for HIV, syphilis, hepatitis B, and hepatitis C as she has not had full STD panel.  - POCT Pregnancy  - POCT Urinalysis  - RPR (SYPHILIS); Future  - HIV AG/AB COMBO ASSAY SCREENING; Future  - HEP C VIRUS ANTIBODY; Future  - HEP B SURFACE ANTIGEN; Future  - Chlamydia/GC, PCR (Urine); Future  - VAGINAL PATHOGENS DNA PANEL; Future  - URINE CULTURE(NEW); Future      Instructed to return to Urgent Care or nearest Emergency Department if symptoms fail to improve, for any change in condition, further concerns, or new concerning symptoms. Patient states understanding of the plan of care and discharge instructions.    Humaira Ackerman M.D.

## 2022-07-06 LAB
BACTERIA UR CULT: NORMAL
SIGNIFICANT IND 70042: NORMAL
SITE SITE: NORMAL
SOURCE SOURCE: NORMAL

## 2022-07-11 ENCOUNTER — HOSPITAL ENCOUNTER (OUTPATIENT)
Dept: LAB | Facility: MEDICAL CENTER | Age: 32
End: 2022-07-11
Attending: PHYSICIAN ASSISTANT
Payer: COMMERCIAL

## 2022-07-11 PROCEDURE — 88175 CYTOPATH C/V AUTO FLUID REDO: CPT

## 2022-07-11 PROCEDURE — 87624 HPV HI-RISK TYP POOLED RSLT: CPT

## 2022-07-12 LAB
CYTOLOGY REG CYTOL: NORMAL
HPV HR 12 DNA CVX QL NAA+PROBE: NEGATIVE
HPV16 DNA SPEC QL NAA+PROBE: NEGATIVE
HPV18 DNA SPEC QL NAA+PROBE: NEGATIVE
SPECIMEN SOURCE: NORMAL

## 2022-07-22 ENCOUNTER — HOSPITAL ENCOUNTER (OUTPATIENT)
Dept: LAB | Facility: MEDICAL CENTER | Age: 32
End: 2022-07-22
Attending: NURSE PRACTITIONER
Payer: COMMERCIAL

## 2022-07-22 ENCOUNTER — HOSPITAL ENCOUNTER (OUTPATIENT)
Dept: LAB | Facility: MEDICAL CENTER | Age: 32
End: 2022-07-22
Attending: FAMILY MEDICINE
Payer: COMMERCIAL

## 2022-07-22 DIAGNOSIS — N89.8 VAGINA ITCHING: ICD-10-CM

## 2022-07-22 DIAGNOSIS — Z72.51 HIGH RISK HETEROSEXUAL BEHAVIOR: ICD-10-CM

## 2022-07-22 LAB
25(OH)D3 SERPL-MCNC: 38 NG/ML (ref 30–100)
ALBUMIN SERPL BCP-MCNC: 4.2 G/DL (ref 3.2–4.9)
ALBUMIN/GLOB SERPL: 1.4 G/DL
ALP SERPL-CCNC: 59 U/L (ref 30–99)
ALT SERPL-CCNC: 9 U/L (ref 2–50)
ANION GAP SERPL CALC-SCNC: 12 MMOL/L (ref 7–16)
AST SERPL-CCNC: 17 U/L (ref 12–45)
BASOPHILS # BLD AUTO: 0.6 % (ref 0–1.8)
BASOPHILS # BLD: 0.05 K/UL (ref 0–0.12)
BILIRUB SERPL-MCNC: 0.6 MG/DL (ref 0.1–1.5)
BUN SERPL-MCNC: 9 MG/DL (ref 8–22)
CALCIUM SERPL-MCNC: 9.4 MG/DL (ref 8.5–10.5)
CHLORIDE SERPL-SCNC: 102 MMOL/L (ref 96–112)
CHOLEST SERPL-MCNC: 186 MG/DL (ref 100–199)
CO2 SERPL-SCNC: 22 MMOL/L (ref 20–33)
CREAT SERPL-MCNC: 0.85 MG/DL (ref 0.5–1.4)
EOSINOPHIL # BLD AUTO: 0.09 K/UL (ref 0–0.51)
EOSINOPHIL NFR BLD: 1 % (ref 0–6.9)
ERYTHROCYTE [DISTWIDTH] IN BLOOD BY AUTOMATED COUNT: 37.8 FL (ref 35.9–50)
FERRITIN SERPL-MCNC: 13.1 NG/ML (ref 10–291)
FOLATE SERPL-MCNC: 7.5 NG/ML
GFR SERPLBLD CREATININE-BSD FMLA CKD-EPI: 94 ML/MIN/1.73 M 2
GLOBULIN SER CALC-MCNC: 3 G/DL (ref 1.9–3.5)
GLUCOSE SERPL-MCNC: 73 MG/DL (ref 65–99)
HBV SURFACE AG SER QL: NORMAL
HCT VFR BLD AUTO: 40 % (ref 37–47)
HCV AB SER QL: NORMAL
HDLC SERPL-MCNC: 74 MG/DL
HGB BLD-MCNC: 13.6 G/DL (ref 12–16)
HIV 1+2 AB+HIV1 P24 AG SERPL QL IA: NORMAL
IMM GRANULOCYTES # BLD AUTO: 0.04 K/UL (ref 0–0.11)
IMM GRANULOCYTES NFR BLD AUTO: 0.5 % (ref 0–0.9)
IRON SATN MFR SERPL: 28 % (ref 15–55)
IRON SERPL-MCNC: 91 UG/DL (ref 40–170)
LDLC SERPL CALC-MCNC: 96 MG/DL
LYMPHOCYTES # BLD AUTO: 2.13 K/UL (ref 1–4.8)
LYMPHOCYTES NFR BLD: 24.7 % (ref 22–41)
MAGNESIUM SERPL-MCNC: 1.8 MG/DL (ref 1.5–2.5)
MCH RBC QN AUTO: 26.5 PG (ref 27–33)
MCHC RBC AUTO-ENTMCNC: 34 G/DL (ref 33.6–35)
MCV RBC AUTO: 78 FL (ref 81.4–97.8)
MONOCYTES # BLD AUTO: 0.43 K/UL (ref 0–0.85)
MONOCYTES NFR BLD AUTO: 5 % (ref 0–13.4)
NEUTROPHILS # BLD AUTO: 5.89 K/UL (ref 2–7.15)
NEUTROPHILS NFR BLD: 68.2 % (ref 44–72)
NRBC # BLD AUTO: 0 K/UL
NRBC BLD-RTO: 0 /100 WBC
PLATELET # BLD AUTO: 284 K/UL (ref 164–446)
PMV BLD AUTO: 11.5 FL (ref 9–12.9)
POTASSIUM SERPL-SCNC: 4.1 MMOL/L (ref 3.6–5.5)
PROT SERPL-MCNC: 7.2 G/DL (ref 6–8.2)
PTH-INTACT SERPL-MCNC: 39.8 PG/ML (ref 14–72)
RBC # BLD AUTO: 5.13 M/UL (ref 4.2–5.4)
SODIUM SERPL-SCNC: 136 MMOL/L (ref 135–145)
T PALLIDUM AB SER QL IA: NORMAL
TIBC SERPL-MCNC: 328 UG/DL (ref 250–450)
TRIGL SERPL-MCNC: 80 MG/DL (ref 0–149)
TSH SERPL DL<=0.005 MIU/L-ACNC: 1.55 UIU/ML (ref 0.38–5.33)
UIBC SERPL-MCNC: 237 UG/DL (ref 110–370)
VIT B12 SERPL-MCNC: 379 PG/ML (ref 211–911)
WBC # BLD AUTO: 8.6 K/UL (ref 4.8–10.8)

## 2022-07-22 PROCEDURE — 84443 ASSAY THYROID STIM HORMONE: CPT

## 2022-07-22 PROCEDURE — 87389 HIV-1 AG W/HIV-1&-2 AB AG IA: CPT

## 2022-07-22 PROCEDURE — 87340 HEPATITIS B SURFACE AG IA: CPT

## 2022-07-22 PROCEDURE — 83735 ASSAY OF MAGNESIUM: CPT

## 2022-07-22 PROCEDURE — 82728 ASSAY OF FERRITIN: CPT

## 2022-07-22 PROCEDURE — 83970 ASSAY OF PARATHORMONE: CPT

## 2022-07-22 PROCEDURE — 83550 IRON BINDING TEST: CPT

## 2022-07-22 PROCEDURE — 83540 ASSAY OF IRON: CPT

## 2022-07-22 PROCEDURE — 36415 COLL VENOUS BLD VENIPUNCTURE: CPT

## 2022-07-22 PROCEDURE — 82746 ASSAY OF FOLIC ACID SERUM: CPT

## 2022-07-22 PROCEDURE — 86803 HEPATITIS C AB TEST: CPT

## 2022-07-22 PROCEDURE — 82306 VITAMIN D 25 HYDROXY: CPT

## 2022-07-22 PROCEDURE — 80053 COMPREHEN METABOLIC PANEL: CPT

## 2022-07-22 PROCEDURE — 82607 VITAMIN B-12: CPT

## 2022-07-22 PROCEDURE — 80061 LIPID PANEL: CPT

## 2022-07-22 PROCEDURE — 86780 TREPONEMA PALLIDUM: CPT

## 2022-07-22 PROCEDURE — 84425 ASSAY OF VITAMIN B-1: CPT

## 2022-07-22 PROCEDURE — 85025 COMPLETE CBC W/AUTO DIFF WBC: CPT

## 2022-07-22 PROCEDURE — 84207 ASSAY OF VITAMIN B-6: CPT

## 2022-07-24 ENCOUNTER — APPOINTMENT (OUTPATIENT)
Dept: RADIOLOGY | Facility: MEDICAL CENTER | Age: 32
End: 2022-07-24
Attending: EMERGENCY MEDICINE
Payer: COMMERCIAL

## 2022-07-24 ENCOUNTER — ANESTHESIA (OUTPATIENT)
Dept: SURGERY | Facility: MEDICAL CENTER | Age: 32
End: 2022-07-24
Payer: COMMERCIAL

## 2022-07-24 ENCOUNTER — ANESTHESIA EVENT (OUTPATIENT)
Dept: SURGERY | Facility: MEDICAL CENTER | Age: 32
End: 2022-07-24
Payer: COMMERCIAL

## 2022-07-24 ENCOUNTER — HOSPITAL ENCOUNTER (EMERGENCY)
Facility: MEDICAL CENTER | Age: 32
End: 2022-07-24
Attending: EMERGENCY MEDICINE | Admitting: OBSTETRICS & GYNECOLOGY
Payer: COMMERCIAL

## 2022-07-24 VITALS
TEMPERATURE: 96.9 F | HEIGHT: 65 IN | SYSTOLIC BLOOD PRESSURE: 129 MMHG | DIASTOLIC BLOOD PRESSURE: 60 MMHG | HEART RATE: 50 BPM | RESPIRATION RATE: 20 BRPM | WEIGHT: 215 LBS | OXYGEN SATURATION: 92 % | BODY MASS INDEX: 35.82 KG/M2

## 2022-07-24 DIAGNOSIS — G89.18 POSTOPERATIVE PAIN: ICD-10-CM

## 2022-07-24 DIAGNOSIS — N83.202 HEMORRHAGIC CYST OF LEFT OVARY: ICD-10-CM

## 2022-07-24 DIAGNOSIS — N83.8 LEFT TUBO-OVARIAN MASS: ICD-10-CM

## 2022-07-24 LAB
ABO GROUP BLD: NORMAL
ALBUMIN SERPL BCP-MCNC: 4.4 G/DL (ref 3.2–4.9)
ALBUMIN/GLOB SERPL: 1.5 G/DL
ALP SERPL-CCNC: 63 U/L (ref 30–99)
ALT SERPL-CCNC: 12 U/L (ref 2–50)
ANION GAP SERPL CALC-SCNC: 17 MMOL/L (ref 7–16)
AST SERPL-CCNC: 20 U/L (ref 12–45)
BASOPHILS # BLD AUTO: 0.2 % (ref 0–1.8)
BASOPHILS # BLD AUTO: 0.3 % (ref 0–1.8)
BASOPHILS # BLD: 0.03 K/UL (ref 0–0.12)
BASOPHILS # BLD: 0.04 K/UL (ref 0–0.12)
BILIRUB SERPL-MCNC: 0.6 MG/DL (ref 0.1–1.5)
BLD GP AB SCN SERPL QL: NORMAL
BUN SERPL-MCNC: 6 MG/DL (ref 8–22)
CALCIUM SERPL-MCNC: 9.1 MG/DL (ref 8.5–10.5)
CHLORIDE SERPL-SCNC: 105 MMOL/L (ref 96–112)
CO2 SERPL-SCNC: 19 MMOL/L (ref 20–33)
CREAT SERPL-MCNC: 0.82 MG/DL (ref 0.5–1.4)
EOSINOPHIL # BLD AUTO: 0.01 K/UL (ref 0–0.51)
EOSINOPHIL # BLD AUTO: 0.04 K/UL (ref 0–0.51)
EOSINOPHIL NFR BLD: 0.1 % (ref 0–6.9)
EOSINOPHIL NFR BLD: 0.3 % (ref 0–6.9)
ERYTHROCYTE [DISTWIDTH] IN BLOOD BY AUTOMATED COUNT: 35.3 FL (ref 35.9–50)
ERYTHROCYTE [DISTWIDTH] IN BLOOD BY AUTOMATED COUNT: 38 FL (ref 35.9–50)
GFR SERPLBLD CREATININE-BSD FMLA CKD-EPI: 98 ML/MIN/1.73 M 2
GLOBULIN SER CALC-MCNC: 3 G/DL (ref 1.9–3.5)
GLUCOSE SERPL-MCNC: 90 MG/DL (ref 65–99)
HCG SERPL QL: NEGATIVE
HCT VFR BLD AUTO: 36 % (ref 37–47)
HCT VFR BLD AUTO: 36.1 % (ref 37–47)
HGB BLD-MCNC: 12.5 G/DL (ref 12–16)
HGB BLD-MCNC: 12.6 G/DL (ref 12–16)
IMM GRANULOCYTES # BLD AUTO: 0.05 K/UL (ref 0–0.11)
IMM GRANULOCYTES # BLD AUTO: 0.07 K/UL (ref 0–0.11)
IMM GRANULOCYTES NFR BLD AUTO: 0.3 % (ref 0–0.9)
IMM GRANULOCYTES NFR BLD AUTO: 0.5 % (ref 0–0.9)
LYMPHOCYTES # BLD AUTO: 1.86 K/UL (ref 1–4.8)
LYMPHOCYTES # BLD AUTO: 2.28 K/UL (ref 1–4.8)
LYMPHOCYTES NFR BLD: 12.4 % (ref 22–41)
LYMPHOCYTES NFR BLD: 15.1 % (ref 22–41)
MCH RBC QN AUTO: 26.4 PG (ref 27–33)
MCH RBC QN AUTO: 26.8 PG (ref 27–33)
MCHC RBC AUTO-ENTMCNC: 34.6 G/DL (ref 33.6–35)
MCHC RBC AUTO-ENTMCNC: 35 G/DL (ref 33.6–35)
MCV RBC AUTO: 75.3 FL (ref 81.4–97.8)
MCV RBC AUTO: 77.5 FL (ref 81.4–97.8)
MONOCYTES # BLD AUTO: 0.71 K/UL (ref 0–0.85)
MONOCYTES # BLD AUTO: 0.87 K/UL (ref 0–0.85)
MONOCYTES NFR BLD AUTO: 4.7 % (ref 0–13.4)
MONOCYTES NFR BLD AUTO: 5.8 % (ref 0–13.4)
NEUTROPHILS # BLD AUTO: 11.82 K/UL (ref 2–7.15)
NEUTROPHILS # BLD AUTO: 12.3 K/UL (ref 2–7.15)
NEUTROPHILS NFR BLD: 78 % (ref 44–72)
NEUTROPHILS NFR BLD: 82.3 % (ref 44–72)
NRBC # BLD AUTO: 0 K/UL
NRBC # BLD AUTO: 0 K/UL
NRBC BLD-RTO: 0 /100 WBC
NRBC BLD-RTO: 0 /100 WBC
PLATELET # BLD AUTO: 269 K/UL (ref 164–446)
PLATELET # BLD AUTO: 321 K/UL (ref 164–446)
PMV BLD AUTO: 9.9 FL (ref 9–12.9)
PMV BLD AUTO: 9.9 FL (ref 9–12.9)
POTASSIUM SERPL-SCNC: 3.9 MMOL/L (ref 3.6–5.5)
PROT SERPL-MCNC: 7.4 G/DL (ref 6–8.2)
RBC # BLD AUTO: 4.66 M/UL (ref 4.2–5.4)
RBC # BLD AUTO: 4.78 M/UL (ref 4.2–5.4)
RH BLD: NORMAL
SODIUM SERPL-SCNC: 141 MMOL/L (ref 135–145)
WBC # BLD AUTO: 15 K/UL (ref 4.8–10.8)
WBC # BLD AUTO: 15.1 K/UL (ref 4.8–10.8)

## 2022-07-24 PROCEDURE — 160036 HCHG PACU - EA ADDL 30 MINS PHASE I: Performed by: OBSTETRICS & GYNECOLOGY

## 2022-07-24 PROCEDURE — 160035 HCHG PACU - 1ST 60 MINS PHASE I: Performed by: OBSTETRICS & GYNECOLOGY

## 2022-07-24 PROCEDURE — 700105 HCHG RX REV CODE 258: Performed by: EMERGENCY MEDICINE

## 2022-07-24 PROCEDURE — 700105 HCHG RX REV CODE 258: Performed by: ANESTHESIOLOGY

## 2022-07-24 PROCEDURE — 160009 HCHG ANES TIME/MIN: Performed by: OBSTETRICS & GYNECOLOGY

## 2022-07-24 PROCEDURE — 160048 HCHG OR STATISTICAL LEVEL 1-5: Performed by: OBSTETRICS & GYNECOLOGY

## 2022-07-24 PROCEDURE — 160002 HCHG RECOVERY MINUTES (STAT): Performed by: OBSTETRICS & GYNECOLOGY

## 2022-07-24 PROCEDURE — 700111 HCHG RX REV CODE 636 W/ 250 OVERRIDE (IP): Performed by: ANESTHESIOLOGY

## 2022-07-24 PROCEDURE — 00790 ANES IPER UPR ABD NOS: CPT | Performed by: ANESTHESIOLOGY

## 2022-07-24 PROCEDURE — 86901 BLOOD TYPING SEROLOGIC RH(D): CPT

## 2022-07-24 PROCEDURE — 96374 THER/PROPH/DIAG INJ IV PUSH: CPT | Mod: XU

## 2022-07-24 PROCEDURE — 96375 TX/PRO/DX INJ NEW DRUG ADDON: CPT | Mod: XU

## 2022-07-24 PROCEDURE — 700102 HCHG RX REV CODE 250 W/ 637 OVERRIDE(OP): Performed by: ANESTHESIOLOGY

## 2022-07-24 PROCEDURE — 160028 HCHG SURGERY MINUTES - 1ST 30 MINS LEVEL 3: Performed by: OBSTETRICS & GYNECOLOGY

## 2022-07-24 PROCEDURE — 80053 COMPREHEN METABOLIC PANEL: CPT

## 2022-07-24 PROCEDURE — 110371 HCHG SHELL REV 272: Performed by: OBSTETRICS & GYNECOLOGY

## 2022-07-24 PROCEDURE — 86900 BLOOD TYPING SEROLOGIC ABO: CPT

## 2022-07-24 PROCEDURE — 160039 HCHG SURGERY MINUTES - EA ADDL 1 MIN LEVEL 3: Performed by: OBSTETRICS & GYNECOLOGY

## 2022-07-24 PROCEDURE — 86850 RBC ANTIBODY SCREEN: CPT

## 2022-07-24 PROCEDURE — 700101 HCHG RX REV CODE 250: Performed by: OBSTETRICS & GYNECOLOGY

## 2022-07-24 PROCEDURE — 85025 COMPLETE CBC W/AUTO DIFF WBC: CPT

## 2022-07-24 PROCEDURE — 700101 HCHG RX REV CODE 250: Performed by: ANESTHESIOLOGY

## 2022-07-24 PROCEDURE — 700111 HCHG RX REV CODE 636 W/ 250 OVERRIDE (IP)

## 2022-07-24 PROCEDURE — 74176 CT ABD & PELVIS W/O CONTRAST: CPT

## 2022-07-24 PROCEDURE — 700111 HCHG RX REV CODE 636 W/ 250 OVERRIDE (IP): Performed by: EMERGENCY MEDICINE

## 2022-07-24 PROCEDURE — 76856 US EXAM PELVIC COMPLETE: CPT

## 2022-07-24 PROCEDURE — A9270 NON-COVERED ITEM OR SERVICE: HCPCS | Performed by: ANESTHESIOLOGY

## 2022-07-24 PROCEDURE — 36415 COLL VENOUS BLD VENIPUNCTURE: CPT

## 2022-07-24 PROCEDURE — 99140 ANES COMP EMERGENCY COND: CPT | Performed by: ANESTHESIOLOGY

## 2022-07-24 PROCEDURE — 84703 CHORIONIC GONADOTROPIN ASSAY: CPT

## 2022-07-24 PROCEDURE — 99291 CRITICAL CARE FIRST HOUR: CPT

## 2022-07-24 PROCEDURE — 96376 TX/PRO/DX INJ SAME DRUG ADON: CPT | Mod: XU

## 2022-07-24 RX ORDER — MORPHINE SULFATE 4 MG/ML
4 INJECTION INTRAVENOUS ONCE
Status: COMPLETED | OUTPATIENT
Start: 2022-07-24 | End: 2022-07-24

## 2022-07-24 RX ORDER — METOPROLOL TARTRATE 1 MG/ML
1 INJECTION, SOLUTION INTRAVENOUS
Status: DISCONTINUED | OUTPATIENT
Start: 2022-07-24 | End: 2022-07-24 | Stop reason: HOSPADM

## 2022-07-24 RX ORDER — IBUPROFEN 600 MG/1
600 TABLET ORAL EVERY 6 HOURS PRN
Qty: 60 TABLET | Refills: 1 | Status: SHIPPED | OUTPATIENT
Start: 2022-07-24 | End: 2023-05-11

## 2022-07-24 RX ORDER — DIPHENHYDRAMINE HYDROCHLORIDE 50 MG/ML
12.5 INJECTION INTRAMUSCULAR; INTRAVENOUS
Status: DISCONTINUED | OUTPATIENT
Start: 2022-07-24 | End: 2022-07-24 | Stop reason: HOSPADM

## 2022-07-24 RX ORDER — ONDANSETRON 2 MG/ML
4 INJECTION INTRAMUSCULAR; INTRAVENOUS
Status: COMPLETED | OUTPATIENT
Start: 2022-07-24 | End: 2022-07-24

## 2022-07-24 RX ORDER — BUPIVACAINE HYDROCHLORIDE AND EPINEPHRINE 5; 5 MG/ML; UG/ML
INJECTION, SOLUTION EPIDURAL; INTRACAUDAL; PERINEURAL
Status: DISCONTINUED | OUTPATIENT
Start: 2022-07-24 | End: 2022-07-24 | Stop reason: HOSPADM

## 2022-07-24 RX ORDER — MEPERIDINE HYDROCHLORIDE 25 MG/ML
12.5 INJECTION INTRAMUSCULAR; INTRAVENOUS; SUBCUTANEOUS
Status: DISCONTINUED | OUTPATIENT
Start: 2022-07-24 | End: 2022-07-24 | Stop reason: HOSPADM

## 2022-07-24 RX ORDER — HYDRALAZINE HYDROCHLORIDE 20 MG/ML
5 INJECTION INTRAMUSCULAR; INTRAVENOUS
Status: DISCONTINUED | OUTPATIENT
Start: 2022-07-24 | End: 2022-07-24 | Stop reason: HOSPADM

## 2022-07-24 RX ORDER — ROCURONIUM BROMIDE 10 MG/ML
INJECTION, SOLUTION INTRAVENOUS PRN
Status: DISCONTINUED | OUTPATIENT
Start: 2022-07-24 | End: 2022-07-24 | Stop reason: SURG

## 2022-07-24 RX ORDER — CEFAZOLIN SODIUM 1 G/3ML
INJECTION, POWDER, FOR SOLUTION INTRAMUSCULAR; INTRAVENOUS PRN
Status: DISCONTINUED | OUTPATIENT
Start: 2022-07-24 | End: 2022-07-24 | Stop reason: SURG

## 2022-07-24 RX ORDER — LIDOCAINE HYDROCHLORIDE 20 MG/ML
INJECTION, SOLUTION EPIDURAL; INFILTRATION; INTRACAUDAL; PERINEURAL PRN
Status: DISCONTINUED | OUTPATIENT
Start: 2022-07-24 | End: 2022-07-24 | Stop reason: SURG

## 2022-07-24 RX ORDER — HALOPERIDOL 5 MG/ML
1 INJECTION INTRAMUSCULAR
Status: DISCONTINUED | OUTPATIENT
Start: 2022-07-24 | End: 2022-07-24 | Stop reason: HOSPADM

## 2022-07-24 RX ORDER — OXYCODONE HYDROCHLORIDE AND ACETAMINOPHEN 5; 325 MG/1; MG/1
1 TABLET ORAL EVERY 4 HOURS PRN
Qty: 28 TABLET | Refills: 0 | Status: SHIPPED | OUTPATIENT
Start: 2022-07-24 | End: 2022-08-21

## 2022-07-24 RX ORDER — OXYCODONE HCL 5 MG/5 ML
10 SOLUTION, ORAL ORAL
Status: COMPLETED | OUTPATIENT
Start: 2022-07-24 | End: 2022-07-24

## 2022-07-24 RX ORDER — KETOROLAC TROMETHAMINE 30 MG/ML
INJECTION, SOLUTION INTRAMUSCULAR; INTRAVENOUS PRN
Status: DISCONTINUED | OUTPATIENT
Start: 2022-07-24 | End: 2022-07-24 | Stop reason: SURG

## 2022-07-24 RX ORDER — HYDROMORPHONE HYDROCHLORIDE 1 MG/ML
0.1 INJECTION, SOLUTION INTRAMUSCULAR; INTRAVENOUS; SUBCUTANEOUS
Status: DISCONTINUED | OUTPATIENT
Start: 2022-07-24 | End: 2022-07-24 | Stop reason: HOSPADM

## 2022-07-24 RX ORDER — LABETALOL HYDROCHLORIDE 5 MG/ML
5 INJECTION, SOLUTION INTRAVENOUS
Status: DISCONTINUED | OUTPATIENT
Start: 2022-07-24 | End: 2022-07-24 | Stop reason: HOSPADM

## 2022-07-24 RX ORDER — NEOSTIGMINE METHYLSULFATE 1 MG/ML
INJECTION, SOLUTION INTRAVENOUS PRN
Status: DISCONTINUED | OUTPATIENT
Start: 2022-07-24 | End: 2022-07-24 | Stop reason: SURG

## 2022-07-24 RX ORDER — MIDAZOLAM HYDROCHLORIDE 1 MG/ML
1 INJECTION INTRAMUSCULAR; INTRAVENOUS
Status: DISCONTINUED | OUTPATIENT
Start: 2022-07-24 | End: 2022-07-24 | Stop reason: HOSPADM

## 2022-07-24 RX ORDER — DOCUSATE SODIUM 100 MG/1
100 CAPSULE, LIQUID FILLED ORAL 2 TIMES DAILY
Qty: 60 CAPSULE | Refills: 1 | Status: SHIPPED | OUTPATIENT
Start: 2022-07-24 | End: 2023-05-02

## 2022-07-24 RX ORDER — IPRATROPIUM BROMIDE AND ALBUTEROL SULFATE 2.5; .5 MG/3ML; MG/3ML
3 SOLUTION RESPIRATORY (INHALATION)
Status: DISCONTINUED | OUTPATIENT
Start: 2022-07-24 | End: 2022-07-24 | Stop reason: HOSPADM

## 2022-07-24 RX ORDER — DEXAMETHASONE SODIUM PHOSPHATE 4 MG/ML
INJECTION, SOLUTION INTRA-ARTICULAR; INTRALESIONAL; INTRAMUSCULAR; INTRAVENOUS; SOFT TISSUE PRN
Status: DISCONTINUED | OUTPATIENT
Start: 2022-07-24 | End: 2022-07-24 | Stop reason: SURG

## 2022-07-24 RX ORDER — PROMETHAZINE HYDROCHLORIDE 25 MG/1
25 SUPPOSITORY RECTAL ONCE
Status: COMPLETED | OUTPATIENT
Start: 2022-07-24 | End: 2022-07-24

## 2022-07-24 RX ORDER — SODIUM CHLORIDE, SODIUM LACTATE, POTASSIUM CHLORIDE, CALCIUM CHLORIDE 600; 310; 30; 20 MG/100ML; MG/100ML; MG/100ML; MG/100ML
INJECTION, SOLUTION INTRAVENOUS CONTINUOUS
Status: DISCONTINUED | OUTPATIENT
Start: 2022-07-24 | End: 2022-07-24 | Stop reason: HOSPADM

## 2022-07-24 RX ORDER — SODIUM CHLORIDE, SODIUM LACTATE, POTASSIUM CHLORIDE, CALCIUM CHLORIDE 600; 310; 30; 20 MG/100ML; MG/100ML; MG/100ML; MG/100ML
INJECTION, SOLUTION INTRAVENOUS
Status: DISCONTINUED | OUTPATIENT
Start: 2022-07-24 | End: 2022-07-24 | Stop reason: SURG

## 2022-07-24 RX ORDER — METRONIDAZOLE 500 MG/1
500 TABLET ORAL 2 TIMES DAILY
COMMUNITY
End: 2022-06-29

## 2022-07-24 RX ORDER — SCOLOPAMINE TRANSDERMAL SYSTEM 1 MG/1
1 PATCH, EXTENDED RELEASE TRANSDERMAL ONCE
Status: DISCONTINUED | OUTPATIENT
Start: 2022-07-24 | End: 2022-07-24 | Stop reason: HOSPADM

## 2022-07-24 RX ORDER — KETOROLAC TROMETHAMINE 30 MG/ML
30 INJECTION, SOLUTION INTRAMUSCULAR; INTRAVENOUS ONCE
Status: COMPLETED | OUTPATIENT
Start: 2022-07-24 | End: 2022-07-24

## 2022-07-24 RX ORDER — HYDROMORPHONE HYDROCHLORIDE 1 MG/ML
0.2 INJECTION, SOLUTION INTRAMUSCULAR; INTRAVENOUS; SUBCUTANEOUS
Status: DISCONTINUED | OUTPATIENT
Start: 2022-07-24 | End: 2022-07-24 | Stop reason: HOSPADM

## 2022-07-24 RX ORDER — HYDROMORPHONE HYDROCHLORIDE 1 MG/ML
0.4 INJECTION, SOLUTION INTRAMUSCULAR; INTRAVENOUS; SUBCUTANEOUS
Status: DISCONTINUED | OUTPATIENT
Start: 2022-07-24 | End: 2022-07-24 | Stop reason: HOSPADM

## 2022-07-24 RX ORDER — SIMETHICONE 125 MG
125 TABLET,CHEWABLE ORAL 3 TIMES DAILY PRN
Status: DISCONTINUED | OUTPATIENT
Start: 2022-07-24 | End: 2022-07-24 | Stop reason: HOSPADM

## 2022-07-24 RX ORDER — ONDANSETRON 2 MG/ML
INJECTION INTRAMUSCULAR; INTRAVENOUS PRN
Status: DISCONTINUED | OUTPATIENT
Start: 2022-07-24 | End: 2022-07-24 | Stop reason: SURG

## 2022-07-24 RX ORDER — ONDANSETRON 2 MG/ML
4 INJECTION INTRAMUSCULAR; INTRAVENOUS ONCE
Status: COMPLETED | OUTPATIENT
Start: 2022-07-24 | End: 2022-07-24

## 2022-07-24 RX ORDER — SODIUM CHLORIDE, SODIUM LACTATE, POTASSIUM CHLORIDE, CALCIUM CHLORIDE 600; 310; 30; 20 MG/100ML; MG/100ML; MG/100ML; MG/100ML
1000 INJECTION, SOLUTION INTRAVENOUS ONCE
Status: COMPLETED | OUTPATIENT
Start: 2022-07-24 | End: 2022-07-24

## 2022-07-24 RX ORDER — PROMETHAZINE HYDROCHLORIDE 25 MG/1
12.5 SUPPOSITORY RECTAL EVERY 4 HOURS PRN
Status: DISCONTINUED | OUTPATIENT
Start: 2022-07-24 | End: 2022-07-24 | Stop reason: HOSPADM

## 2022-07-24 RX ORDER — OXYCODONE HCL 5 MG/5 ML
5 SOLUTION, ORAL ORAL
Status: COMPLETED | OUTPATIENT
Start: 2022-07-24 | End: 2022-07-24

## 2022-07-24 RX ORDER — GLYCOPYRROLATE 0.2 MG/ML
INJECTION INTRAMUSCULAR; INTRAVENOUS PRN
Status: DISCONTINUED | OUTPATIENT
Start: 2022-07-24 | End: 2022-07-24 | Stop reason: SURG

## 2022-07-24 RX ORDER — OXYCODONE HYDROCHLORIDE AND ACETAMINOPHEN 5; 325 MG/1; MG/1
1 TABLET ORAL EVERY 4 HOURS PRN
Qty: 28 TABLET | Refills: 0 | Status: SHIPPED | OUTPATIENT
Start: 2022-07-24 | End: 2022-07-31

## 2022-07-24 RX ORDER — IBUPROFEN 400 MG/1
400 TABLET ORAL EVERY 6 HOURS PRN
COMMUNITY

## 2022-07-24 RX ADMIN — SODIUM CHLORIDE, POTASSIUM CHLORIDE, SODIUM LACTATE AND CALCIUM CHLORIDE 1000 ML: 600; 310; 30; 20 INJECTION, SOLUTION INTRAVENOUS at 15:26

## 2022-07-24 RX ADMIN — FENTANYL CITRATE 100 MCG: 50 INJECTION, SOLUTION INTRAMUSCULAR; INTRAVENOUS at 17:31

## 2022-07-24 RX ADMIN — DEXAMETHASONE SODIUM PHOSPHATE 8 MG: 4 INJECTION, SOLUTION INTRA-ARTICULAR; INTRALESIONAL; INTRAMUSCULAR; INTRAVENOUS; SOFT TISSUE at 17:39

## 2022-07-24 RX ADMIN — GLYCOPYRROLATE 0.4 MG: 0.2 INJECTION INTRAMUSCULAR; INTRAVENOUS at 18:03

## 2022-07-24 RX ADMIN — ONDANSETRON HYDROCHLORIDE 4 MG: 2 SOLUTION INTRAMUSCULAR; INTRAVENOUS at 11:21

## 2022-07-24 RX ADMIN — MORPHINE SULFATE 4 MG: 4 INJECTION INTRAVENOUS at 15:24

## 2022-07-24 RX ADMIN — LIDOCAINE HYDROCHLORIDE 100 MG: 20 INJECTION, SOLUTION EPIDURAL; INFILTRATION; INTRACAUDAL at 16:56

## 2022-07-24 RX ADMIN — ONDANSETRON HYDROCHLORIDE 4 MG: 2 SOLUTION INTRAMUSCULAR; INTRAVENOUS at 12:22

## 2022-07-24 RX ADMIN — PROMETHAZINE HYDROCHLORIDE 25 MG: 25 SUPPOSITORY RECTAL at 20:28

## 2022-07-24 RX ADMIN — FENTANYL CITRATE 25 MCG: 50 INJECTION, SOLUTION INTRAMUSCULAR; INTRAVENOUS at 19:08

## 2022-07-24 RX ADMIN — OXYCODONE HYDROCHLORIDE 5 MG: 5 SOLUTION ORAL at 19:07

## 2022-07-24 RX ADMIN — ONDANSETRON 4 MG: 2 INJECTION INTRAMUSCULAR; INTRAVENOUS at 17:55

## 2022-07-24 RX ADMIN — SODIUM CHLORIDE, POTASSIUM CHLORIDE, SODIUM LACTATE AND CALCIUM CHLORIDE: 600; 310; 30; 20 INJECTION, SOLUTION INTRAVENOUS at 16:51

## 2022-07-24 RX ADMIN — NEOSTIGMINE METHYLSULFATE 3 MG: 1 INJECTION INTRAVENOUS at 18:03

## 2022-07-24 RX ADMIN — PROPOFOL 200 MG: 10 INJECTION, EMULSION INTRAVENOUS at 16:56

## 2022-07-24 RX ADMIN — ONDANSETRON HYDROCHLORIDE 4 MG: 2 SOLUTION INTRAMUSCULAR; INTRAVENOUS at 18:28

## 2022-07-24 RX ADMIN — SUGAMMADEX 200 MG: 100 INJECTION, SOLUTION INTRAVENOUS at 18:09

## 2022-07-24 RX ADMIN — KETOROLAC TROMETHAMINE 30 MG: 30 INJECTION, SOLUTION INTRAMUSCULAR at 18:11

## 2022-07-24 RX ADMIN — HALOPERIDOL LACTATE 1 MG: 5 INJECTION, SOLUTION INTRAMUSCULAR at 18:39

## 2022-07-24 RX ADMIN — MORPHINE SULFATE 4 MG: 4 INJECTION INTRAVENOUS at 13:39

## 2022-07-24 RX ADMIN — MORPHINE SULFATE 4 MG: 4 INJECTION INTRAVENOUS at 12:22

## 2022-07-24 RX ADMIN — ROCURONIUM BROMIDE 50 MG: 10 INJECTION, SOLUTION INTRAVENOUS at 16:56

## 2022-07-24 RX ADMIN — KETOROLAC TROMETHAMINE 30 MG: 30 INJECTION, SOLUTION INTRAMUSCULAR; INTRAVENOUS at 11:21

## 2022-07-24 RX ADMIN — CEFAZOLIN 2 G: 330 INJECTION, POWDER, FOR SOLUTION INTRAMUSCULAR; INTRAVENOUS at 17:01

## 2022-07-24 RX ADMIN — FENTANYL CITRATE 150 MCG: 50 INJECTION, SOLUTION INTRAMUSCULAR; INTRAVENOUS at 17:13

## 2022-07-24 ASSESSMENT — PAIN DESCRIPTION - PAIN TYPE
TYPE: SURGICAL PAIN

## 2022-07-24 ASSESSMENT — FIBROSIS 4 INDEX: FIB4 SCORE: 0.62

## 2022-07-24 NOTE — H&P
See dictated H &P   Pt with left adnexal mass and hemoperitoneum with suspected left hemorrhagic cyst. TO O.R. for dx laparoscopy vs minilap vs left ovarian cystectomy vs left oophorectomy and evacuation of hemoperitoneum.

## 2022-07-24 NOTE — ED TRIAGE NOTES
"Chief Complaint   Patient presents with   • Back Pain     Back pain since Wednesday. Pt describes pain as \"along entire back\" from cervical to lumbar region. Denies any recent trauma. States pain has been so bad she has been unable to walk.       "

## 2022-07-24 NOTE — ANESTHESIA PREPROCEDURE EVALUATION
Case: [id number] Date/Time: 07/24/22 1530    Procedures:       PELVISCOPY (N/A Abdomen)      MINILAPAROTOMY-POSSIBLE (N/A Abdomen)      LAPAROSCOPY, DIAGNOSTIC (N/A Abdomen)      EVACUATION OF HEMOPERITONEUM (N/A Abdomen)      OVARIAN CYSTECTOMY, LEFT, POSSIBLE (Left Abdomen)      OOPHORECTOMY, LEFT, POSSIBLE (Left Abdomen)    Anesthesia type: General    Location: [place] [address] / SURGERY [facility]    Surgeons: Randa Whalen M.D.          Relevant Problems   Other   (positive) PCO (polycystic ovaries)   (positive) Sickle cell trait (HCC)       Physical Exam    Airway   Mallampati: II  TM distance: >3 FB  Neck ROM: full       Cardiovascular - normal exam  Rhythm: regular  Rate: normal  (-) murmur     Dental - normal exam           Pulmonary - normal exam  Breath sounds clear to auscultation     Abdominal    Neurological - normal exam         Other findings: IV not working            Anesthesia Plan    ASA 2- EMERGENT   ASA physical status emergent criteria: acute peritonitis    Plan - general       Airway plan will be ETT          Induction: intravenous    Postoperative Plan: Postoperative administration of opioids is intended.    Pertinent diagnostic labs and testing reviewed    Informed Consent:    Anesthetic plan and risks discussed with patient.    Use of blood products discussed with: patient whom consented to blood products.

## 2022-07-24 NOTE — ED NOTES
Med rec completed per patient at bedside.  Allergies reviewed with patient. KAHLIL.  On 6/22/2022 patient was prescribed a 7 day course of metronidazole, which she reports she finished. Prior to this, patient had been prescribed a 7 day course of doxycycline on 6/16/2022, which she again reports she finished.  Patient's preferred pharmacy: [facility] on [address].

## 2022-07-24 NOTE — ED PROVIDER NOTES
"ED Provider Note    Scribed for Cordelia Dawson M.D. by Josue Cortez-Reyes. 2022  10:08 AM    Primary care provider: ABDON Lu  Means of arrival: EMS  History obtained from: Patient  History limited by: None    CHIEF COMPLAINT  Chief Complaint   Patient presents with   • Back Pain     Back pain since Wednesday. Pt describes pain as \"along entire back\" from cervical to lumbar region. Denies any recent trauma. States pain has been so bad she has been unable to walk.       HPI  Maggy Solis is a 32 y.o. female who presents to the Emergency Department complaining of low back and pelvic pain. She states that her pain started as sudden onset neck and shoulder pain prompting her to present to a chiropractor for an adjustment on Wednesday. She then presented to a physical therapist at her work on Thursday who \"popped her neck and ribs\" with mild alleviation of her neck and shoulder pain. The patient states that her back pain onset at 4:00 AM this morning prompting her to present to the ED today. She describes her back pain as sharp and rates it as a 7/10. Reports prior gastric sleeve surgery and  but denies any other surgeries or hospitalizations. The patient endorses additional groin pain but denies any vaginal discharge. She states that her back pain is exacerbated with inhalations and exhalations but denies any shortness of breath, nausea, vomiting, diarrhea, abdominal pain, fever, chills, lower extremity numbness or weakness. She also denies any chance of pregnancy.     REVIEW OF SYSTEMS  PULMONARY: no dyspnea  GI: no vomiting, diarrhea, or abdominal pain   : Groin pain, no vaginal discharge  Neuro: no weakness, numbness, aphasia, or headache  Musculoskeletal: lower back pain   Endocrine: no fevers or chills    See history of present illness. All other systems are negative. C.    PAST MEDICAL HISTORY   has a past medical history of PCO (polycystic ovaries) (2012) and Sickle cell " "trait (HCC) (2012).    SURGICAL HISTORY   has a past surgical history that includes  delivery only (Bilateral, 2019).    SOCIAL HISTORY  Social History     Tobacco Use   • Smoking status: Passive Smoke Exposure - Never Smoker   • Smokeless tobacco: Never Used   • Tobacco comment: both parents smoked and boyfriend smokes   Vaping Use   • Vaping Use: Never used   Substance Use Topics   • Alcohol use: Yes     Alcohol/week: 2.4 oz     Types: 4 Shots of liquor per week   • Drug use: No      Social History     Substance and Sexual Activity   Drug Use No       FAMILY HISTORY  Family History   Problem Relation Age of Onset   • Diabetes Mother        CURRENT MEDICATIONS  Current Outpatient Medications   Medication Instructions   • ethinyl estradiol-etonogestrel (NUVARING) 0.12-0.015 MG/24HR vaginal ring 1 Each, Vaginal       ALLERGIES  No Known Allergies    PHYSICAL EXAM  VITAL SIGNS: BP (!) 141/94   Pulse (!) 107   Temp 36.4 °C (97.6 °F) (Temporal)   Resp 16   Ht 1.651 m (5' 5\")   Wt 97.5 kg (215 lb)   LMP 07/10/2022   SpO2 96%   Breastfeeding No   BMI 35.78 kg/m²     Constitutional: Well developed, Well nourished, No acute distress, Non-toxic appearance.   HEENT: Normocephalic, Atraumatic,  external ears normal, pharynx pink,  Mucous  Membranes moist, No rhinorrhea or mucosal edema  Eyes: PERRL, EOMI, Conjunctiva normal, No discharge.   Neck: Normal range of motion, No tenderness, Supple, No stridor.   Lymphatic: No lymphadenopathy    Cardiovascular: Regular Rate and Rhythm, No murmurs,  rubs, or gallops.   Thorax & Lungs: Lungs clear to auscultation bilaterally, No respiratory distress, No wheezes, rhales or rhonchi, No chest wall tenderness.   Abdomen: Bowel sounds normal, Soft, pelvic tenderness to palpation, non tender abdomen, non distended,  No pulsatile masses., no rebound guarding or peritoneal signs.   Skin: Warm, Dry, No erythema, No rash,   Back:  No CVA tenderness,  No spinal " tenderness, bony crepitance, step offs, or instability.   Neurologic: Alert & oriented clear speech no focal deficits  Extremities: Equal, intact distal pulses, No cyanosis, clubbing or edema,  No tenderness.   Musculoskeletal: Good range of motion in all major joints. No tenderness to palpation or major deformities noted.       DIAGNOSTIC STUDIES / PROCEDURES    LABS  Results for orders placed or performed during the hospital encounter of 07/24/22   CBC WITH DIFFERENTIAL   Result Value Ref Range    WBC 15.0 (H) 4.8 - 10.8 K/uL    RBC 4.78 4.20 - 5.40 M/uL    Hemoglobin 12.6 12.0 - 16.0 g/dL    Hematocrit 36.0 (L) 37.0 - 47.0 %    MCV 75.3 (L) 81.4 - 97.8 fL    MCH 26.4 (L) 27.0 - 33.0 pg    MCHC 35.0 33.6 - 35.0 g/dL    RDW 35.3 (L) 35.9 - 50.0 fL    Platelet Count 321 164 - 446 K/uL    MPV 9.9 9.0 - 12.9 fL    Neutrophils-Polys 82.30 (H) 44.00 - 72.00 %    Lymphocytes 12.40 (L) 22.00 - 41.00 %    Monocytes 4.70 0.00 - 13.40 %    Eosinophils 0.10 0.00 - 6.90 %    Basophils 0.20 0.00 - 1.80 %    Immature Granulocytes 0.30 0.00 - 0.90 %    Nucleated RBC 0.00 /100 WBC    Neutrophils (Absolute) 12.30 (H) 2.00 - 7.15 K/uL    Lymphs (Absolute) 1.86 1.00 - 4.80 K/uL    Monos (Absolute) 0.71 0.00 - 0.85 K/uL    Eos (Absolute) 0.01 0.00 - 0.51 K/uL    Baso (Absolute) 0.03 0.00 - 0.12 K/uL    Immature Granulocytes (abs) 0.05 0.00 - 0.11 K/uL    NRBC (Absolute) 0.00 K/uL   COMP METABOLIC PANEL   Result Value Ref Range    Sodium 141 135 - 145 mmol/L    Potassium 3.9 3.6 - 5.5 mmol/L    Chloride 105 96 - 112 mmol/L    Co2 19 (L) 20 - 33 mmol/L    Anion Gap 17.0 (H) 7.0 - 16.0    Glucose 90 65 - 99 mg/dL    Bun 6 (L) 8 - 22 mg/dL    Creatinine 0.82 0.50 - 1.40 mg/dL    Calcium 9.1 8.5 - 10.5 mg/dL    AST(SGOT) 20 12 - 45 U/L    ALT(SGPT) 12 2 - 50 U/L    Alkaline Phosphatase 63 30 - 99 U/L    Total Bilirubin 0.6 0.1 - 1.5 mg/dL    Albumin 4.4 3.2 - 4.9 g/dL    Total Protein 7.4 6.0 - 8.2 g/dL    Globulin 3.0 1.9 - 3.5 g/dL     A-G Ratio 1.5 g/dL   BETA-HCG QUALITATIVE SERUM   Result Value Ref Range    Beta-Hcg Qualitative Serum Negative Negative   ESTIMATED GFR   Result Value Ref Range    GFR (CKD-EPI) 98 >60 mL/min/1.73 m 2   COD - Adult (Type and Screen)   Result Value Ref Range    ABO Grouping Only O     Rh Grouping Only NEG     Antibody Screen-Cod NEG        All labs reviewed by me.    RADIOLOGY  US-PELVIC COMPLETE (TRANSABDOMINAL/TRANSVAGINAL) (COMBO)   Final Result      1.  Large ill-defined left adnexal mass with solid and complex cystic components measuring 7.2 x 5.9 x 4.7 cm in size. There appears to be some discerned within that mass. There is arterial flow identified within the mass. Differential diagnosis includes    ruptured ovarian cyst with some ovarian intraparenchymal hemorrhage, underlying ovarian mass with hemorrhage and tubo-ovarian abscess. There is arterial flow identified and therefore this does not appear to represent ovarian torsion.      2.  Free fluid within the pelvis.      3.  Normal right ovary.      4.  Small uterine fibroid.      CT-RENAL COLIC EVALUATION(A/P W/O)   Final Result         1. Hemoperitoneum with high density blood seen in the perihepatic region as well as bilateral lower quadrants and pelvis.      Very heterogeneous masslike area in the left adnexa, measuring 5.3 x 6.3 cm. This could relate to an enlarged ovary or a hematoma.      The findings are concerning for rupture hemorrhagic cyst or rupture ectopic pregnancy if patient is pregnant.      Further evaluation with pelvic ultrasound is recommended.      2. No hydronephrosis. No renal calculus.         CRITICAL RESULT READ BACK: Preliminary findings discussed with and critical read back performed by Dr. AMOR CROSS in the Emergency Department via telephone on 7/24/2022 11:54 AM        The radiologist's interpretation of all radiological studies have been reviewed by me.    COURSE & MEDICAL DECISION MAKING  Nursing notes, VS, PMSFHx reviewed  in chart.    10:08 AM - Patient seen and examined at bedside. Patient will be treated with Toradol and Zofran. Ordered CT- Renal Colic, CBC w/, RH  type for Rhogam, UA culture if indicated, HCG quantitative, and CMP to evaluate her symptoms. I informed the patient of my plan to obtain the above labs and imaging. Patient verbalizes understanding and agreement to this plan of care. The differential diagnoses include but are not limited to: Musculoskeletal pain, Disc disease, Ectopic pregnancy, Ovarian cyst, and Kidney stones.     11:56 AM - I reevaluated the patient at bedside. I updated her on her labs and imaging results noted above, informing her that her symptoms were likely secondary to a ruptured ovarian cyst. I discussed my plan to obtain an ultrasound of her pelvis and discuss her case with GYN. Patient verbalizes understanding and agreement to this plan of care. Ordered US-Pelvic complete (transabdominal/transvaginal) to further evaluate. The patient will be treated with Zofran and morphine.     1:32 PM - The patient will be treated with morphine for her pain.     2:00 PM - I reevaluated the patient at bedside and updated her on imaging results noted above. The patient will be treated with morphine and Lactated ringers (LR) bolus.     2:24 PM - I discussed the patient's case and the above findings with Dr. Whalen (GYN) who agrees to evaluate the patient. She will admit the patient to the OR.     Critical Care  Due to the real possibility of a deterioration of this patient's condition required the highest level of my preparedness for sudden emergent intervention. I provided critical care services which included medication orders, frequent reevaluations of the patient's condition and response to treatment, ordering and reviewing test results and discussing the case with various consultants. The critical care time associated with the care of the patient was 40 minutes. Review chart for interventions. This  time is exclusive of any other billable procedures.    I independently evaluated the patient and repeated the important components of the history and physical.  I discussed the management with the resident.  I have reviewed and agree with the pertinent clinical information as above including history, exam, study findings and recommendations.        HYDRATION: Based on the patient's presentation of Abnormal Fluid Loss the patient was given IV fluids. IV Hydration was used because oral hydration was not adequate alone. Upon recheck following hydration, the patient was well improved.    DISPOSITION:  Patient will be hospitalized by Dr. Whalen in guarded condition.      FINAL IMPRESSION  1. Hemorrhagic cyst of left ovary    2. Left tubo-ovarian mass          I, Josue Cortez-Reyes (Scribe), am scribing for, and in the presence of, Cordelia Dawson M.D..    Electronically signed by: Josue Cortez-Reyes (Scribe), 7/24/2022    Cordelia QUIROGA M.D. personally performed the services described in this documentation, as scribed by Josue Cortez-Reyes in my presence, and it is both accurate and complete. C.    The note accurately reflects work and decisions made by me.  Cordelia Dawson M.D.  7/24/2022  4:35 PM

## 2022-07-25 NOTE — DISCHARGE INSTRUCTIONS
No heaving lifting more than 15lb until cleared by MD.If any questions arise, call your provider.  If your provider is not available, please feel free to call the Surgical Center at [phone].    MEDICATIONS: Resume taking daily medication.  Take prescribed pain medication with food.  If no medication is prescribed, you may take non-aspirin pain medication if needed.  PAIN MEDICATION CAN BE VERY CONSTIPATING.  Take a stool softener or laxative such as senokot, pericolace, or milk of magnesia if needed.    Last pain medication given at 1907.

## 2022-07-25 NOTE — ANESTHESIA POSTPROCEDURE EVALUATION
Patient: Maggy Solis    Procedure Summary     Date: 07/24/22 Room / Location: [place] [address] / SURGERY [facility]    Anesthesia Start: 1651 Anesthesia Stop: 1818    Procedures:       LAPAROSCOPY, DIAGNOSTIC (N/A Abdomen)      EVACUATION OF HEMOPERITONEUM AND CAUTERIZATION OF HEMORRHAGIC CYST (N/A Abdomen)      LYSIS, ADHESIONS (N/A Abdomen) Diagnosis: (BLEEDING LEFT HEMORRHAGIC CYST, HEMOPERITONEUM)    Surgeons: Randa Whalen M.D. Responsible Provider: Obi Ennis M.D.    Anesthesia Type: general ASA Status: 2 - Emergent          Final Anesthesia Type: general  Last vitals  BP   Blood Pressure: (!) 150/86    Temp   35.9 °C (96.7 °F)    Pulse   60   Resp   18    SpO2   99 %      Anesthesia Post Evaluation    Patient location during evaluation: PACU  Patient participation: complete - patient participated  Level of consciousness: awake and alert    Airway patency: patent  Anesthetic complications: no  Cardiovascular status: hemodynamically stable  Respiratory status: acceptable  Hydration status: euvolemic    PONV: none          No complications documented.     Nurse Pain Score: 4 (NPRS)

## 2022-07-25 NOTE — ANESTHESIA TIME REPORT
Anesthesia Start and Stop Event Times     Date Time Event    7/24/2022 1642 Ready for Procedure     1651 Anesthesia Start     1818 Anesthesia Stop        Responsible Staff  07/24/22    Name Role Begin End    Obi Ennis M.D. Anesth 1651 1818        Overtime Reason:  no overtime (within assigned shift)    Comments:

## 2022-07-25 NOTE — CONSULTS
DATE OF SERVICE:  2022     EMERGENCY ROOM CONSULTATION     CHIEF COMPLAINT:  Severe lower abdominal pain radiating to the back since 4:00   this morning.     HISTORY OF PRESENT ILLNESS:  This patient is a 32-year-old  6, para 1   -American female who is a patient of Dr. Todd 's group.  She is   seen by their PA and was seen about 3 weeks ago.  The patient states that this   morning while she was already awake at about 4:00 in the morning, she started   having severe lower abdominal pain, bilateral radiating to the back.  The   pain was so bad that she came to the emergency room.  The patient states that   she was on NuvaRing, but was having issues with it, so her physician's   assistant told her 3 weeks ago to discontinue the NuvaRing, so the patient has   not been on any birth control.  She states that she occasionally uses   condoms.  When she arrived, she was having significant pain.  She had a beta   hCG that was negative.  A CT scan was performed and it shows a large left   adnexal mass with solid and cystic component that measures 7.2x5.9x4.7 cm and   there is free fluid consistent with hemoperitoneum.  She also had a pelvic   ultrasound and the ultrasound shows the same findings, large ill-defined left   adnexal mass, ruptured ovarian cyst versus tubo-ovarian abscess, free fluid in   the pelvis, normal right ovary, small uterine fibroid.  At this time, I have   examined the patient.  She is exquisitely tender and I have discussed with the   patient the need to go to the operating room for a diagnostic laparoscopy   versus mini laparotomy, evacuation of hemoperitoneum, possible left ovarian   cystectomy versus oophorectomy.  I discussed with the patient and her mother   the risks, benefits, indications and alternatives to surgery and the patient   wants to proceed with surgery.  The patient is asking for pain medication at   this time.     PAST MEDICAL HISTORY:  1.  Polycystic ovarian  syndrome.  2.  Sickle cell trait.  3.  Hypertension.     PAST SURGICAL HISTORY:  1.  Gastric sleeve performed in .  2.  Previous  section performed in 2019.  3.  Therapeutic abortions x4.     MEDICATIONS:  She was on NuvaRing and stopped it 3 weeks ago.  She was on   losartan and stopped them.  She was on amlodipine and stopped it and she was   on metoprolol and stopped it about a month ago.     ALLERGIES:  No known drug allergies.     OBSTETRICAL HISTORY:   6, para 1.  The patient had 1 SAB in the first   trimester.  She has had 4 therapeutic abortions in the first trimester.  She   had 1  section in 2019 at term, male for arrest of dilatation at 4 cm   at term.     GYNECOLOGIC HISTORY:  The patient was menstruating once a month on the   NuvaRing, but stopped it 3 weeks ago.  She does have a history of chlamydia   and gonorrhea and Trichomonas.  Most recently, she states her cultures were   negative.     SOCIAL HISTORY:  The patient is single.  She does use ecstasy and she does use   alcohol socially.     PHYSICAL EXAMINATION:  VITAL SIGNS:  Blood pressure 141/94, her heart rate is 107, temperature 36.4,   respirations 16, BMI is 35.78.  GENERAL:  Pleasant female, well-developed, well-nourished, in mild distress.  LUNGS:  Clear to auscultation bilaterally.  CARDIOVASCULAR:  Regular rate and rhythm.  No murmur.  ABDOMEN:  Soft.  Positive bowel sounds, no rebound, but guarding with deep   palpation.  GENITOURINARY:  Deferred.  EXTREMITIES:  No calf tenderness.     LABORATORY DATA:  The patient's white count 15.0.  Her H and H is 12.6 and   36.0 and her platelets are 321.  Her CMP is normal and her creatinine is 0.82.    Her beta hCG is negative.     DIAGNOSTIC DATA:  The ultrasound of the pelvis shows large ill-defined left   adnexal mass with solid and complex cystic components that measures   7.2x5.9x4.7 cm.  There is arterial blood flow within the mass.  Differential   is ruptured  ovarian cyst versus underlying mass versus tubo-ovarian abscess.    There is free fluid within the pelvis consistent with hemoperitoneum.  The CT   showed hemoperitoneum with high density blood seen in the perihepatic region   and lower quadrants.     ASSESSMENT AND PLAN:  A 32-year-old  6, para 1 -American female.  1.  Suspected hemoperitoneum/left adnexal mass.  The patient likely with a   left hemorrhagic cyst.  The patient is in acute pain.  I have discussed with   the patient and her mother the need to go to the operating room to perform a   diagnostic laparoscopy versus a mini laparotomy with evacuation of the   hemoperitoneum, possible left ovarian cystectomy versus oophorectomy.  I   discussed with them the risks, benefits, indications and alternatives to   surgery.  They have no unanswered questions and ready to proceed.  I have   called the operating room and they are finding an anesthesiologist to proceed   with surgery.  We will type and screen the patient and n.p.o. for now.  2.  Contraception.  The patient has discontinued her NuvaRing and I emphasized   the need to resume NuvaRing or birth control pills when she follows up with   Dr. Mahoney's office.  3.  Hypertension.  The patient took herself off of her blood pressure   medications and I emphasized the need to follow up with her primary care   doctor for appropriate treatment of her hypertension.  4.  Obesity.  5.  Previous gastric sleeve.        ______________________________  MD REYNOLD DAVIS/POONAM    DD:  2022 15:08  DT:  2022 19:04    Job#:  [id number]    CC:GRAEME MAHONEY MD

## 2022-07-25 NOTE — ANESTHESIA PROCEDURE NOTES
Airway    Date/Time: 7/24/2022 4:58 PM  Performed by: Obi Ennis M.D.  Authorized by: Obi Ennis M.D.     Location:  OR  Urgency:  Elective  Difficult Airway: No    Indications for Airway Management:  Anesthesia      Spontaneous Ventilation: absent    Sedation Level:  Deep  Preoxygenated: Yes    Patient Position:  Sniffing  Mask Difficulty Assessment:  1 - vent by mask  Final Airway Type:  Endotracheal airway  Final Endotracheal Airway:  ETT  Cuffed: Yes    Technique Used for Successful ETT Placement:  Direct laryngoscopy  Devices/Methods Used in Placement:  Cricoid pressure    Insertion Site:  Oral  Blade Type:  Parsons  Laryngoscope Blade/Videolaryngoscope Blade Size:  2  ETT Size (mm):  7.5  Measured from:  Teeth  ETT to Teeth (cm):  22  Placement Verified by: auscultation and capnometry    Cormack-Lehane Classification:  Grade IIa - partial view of glottis  Number of Attempts at Approach:  1

## 2022-07-25 NOTE — OR NURSING
1815: Pt arrives to PACU asleep and calm. VSS. 2 sits to lower abdomen with band aid in place both CDI.     1905: Pts mom called and updated.    2045: Pt sitting up in [facility]. VSS. Sites both CDI. Pt now able to tolerate PO without nausea. DC instructions were reviewed with mom, all questions were answered. And IV was DC. Pt was wheeled to her moms car with RN.

## 2022-07-25 NOTE — OP REPORT
DATE OF SERVICE:  07/24/2022     PREOPERATIVE DIAGNOSES:  1.  Severe abdominal pain.  2.  Suspected hemoperitoneum.  3.  Suspected left hemorrhagic cyst.  4.  A 7 cm left adnexal mass.     POSTOPERATIVE DIAGNOSES:  1.  Severe abdominal pain.  2.  Suspected hemoperitoneum.  3.  Suspected left hemorrhagic cyst.  4.  A 7 cm left adnexal mass.  5.  Bleeding left hemorrhagic cyst with organized blood clot around the left   ovary and omental adhesions to the uterus.  No adnexal mass seen.     PROCEDURES:  1.  Exam under anesthesia.  2.  Diagnostic laparoscopy.  3.  Evacuation of hemoperitoneum.  4.  Cauterization of left ovarian hemorrhagic cyst.  5.  Lysis of adhesions.     SURGEON:  Randa Whalen MD     ASSISTANT:  Mingo Andrews MD     ANESTHESIOLOGIST:  Obi Ennis MD     TYPE OF ANESTHESIA:  General endotracheal anesthesia.     INTRAVENOUS FLUIDS:  1 liter of LR.     URINE OUTPUT:  100 mL clear urine at the end of procedure.     ESTIMATED BLOOD LOSS:  Less than 50 mL.     COMPLICATIONS:  None.     RECOVERY:  Stable to the PACU.     SPECIMENS REMOVED:  None.     FINDINGS:  A 1 liter of blood in the pelvis up to the liver with organized   blood clot around the left ovary, which on ultrasound must have appeared like   an adnexal mass, but no mass seen.  Normal uterus, omental adhesions to the   uterus.  Normal right ovary, normal right fallopian tube, bleeding left   ovarian hemorrhagic cyst that was cauterized and it was hemostatic at the end   of the procedure.  Normal left fallopian tube, normal liver.     DESCRIPTION OF PROCEDURE:  The patient was taken to the operating room where   she received uncomplicated general endotracheal anesthesia.  She was placed in   Ritesh stirrups, prepped and draped in the usual sterile fashion.  A Ge was   placed to drain her bladder.  An exam under anesthesia revealed above   findings.  A weighted speculum was placed in the vagina.  The anterior lip of   the cervix was  grasped with a single tooth tenaculum and an acorn cannula was   introduced into the uterus and attached to the single tooth tenaculum.  The   speculum was then removed from the vagina.  Surgeon's gloves changed.    Attention was then turned to the abdomen where a total of 10 mL of Marcaine   with 0.25% epinephrine was injected, 5 at the umbilicus and 5 mL   suprapubically.  An 11 mm umbilical incision was made with a scalpel.  A 5 mm   and 11 mm trocar was introduced.  The Veress needle was then introduced with   saline attached.  It was injected and aspirated revealing clear fluid, no   feces and no blood.  At this time, the CO2 was connected.  Initial entry   pressure was read as 5 mm and then using CO2, the pelvis was insufflated with   4 liters of CO2 obtaining an excellent dome.  The Veress needle was then   removed and 11 mm trocar was introduced without any problems.  The laparoscope   was then introduced revealing the above findings.  A 5 mm suprapubic incision   was made with a scalpel.  A 5 mm trocar was introduced under direct   visualization.  The patient was then placed in Trendelenburg position.  The   suction  was introduced into the pelvis and all the hemoperitoneum   was evacuated.  The pelvis was vigorously irrigated.  All of the organized   clot around the left ovary was removed and then at this time, I was able to   see the bleeding left ovary.  Using the LigaSure, the hemorrhagic ovary was   cauterized and hemostasis was achieved.  At this time, the whole pelvis was   irrigated and again, the left ovary was found to be hemostatic with no more   bleeding.  There was a large omental adhesion that was attached from the   omentum to the top of the uterus and this was taken down using the LigaSure.    It was cauterized and cut.  All the irrigant was removed from the pelvis.  The   pressure was taken down.  She had a normal anatomy and at this time, the   pelvis was found to be hemostatic  with no evidence of any bleeding.  I did   place Shannon powder over the left ovary.  The CO2 was expelled from the   pelvis.  The instruments were removed without any problems.  The umbilical   fascial incision was approximated with a 2-0 Vicryl on a CT-2 needle and the   skin was approximated with 4-0 Vicryl on an SH needle.  The 5 mm suprapubic   incision was approximated with a 4-0 Vicryl on an SH needle.  Band-Aids were   placed.  Attention was then turned to the vagina.  The single tooth tenaculum   was removed from the anterior lip of the cervix and the acorn cannula was   removed from the uterus.  The tenaculum sites were found to be hemostatic.  At   this time, the Ge bulb was deflated and the Ge was removed.  The   patient was taken out of [facility].  She woke up from anesthesia without   any problems and was taken to the recovery room in stable condition.  Lap and   needle counts were correct x2.  She did receive 2 grams of Ancef before the   start of surgery.        ______________________________  MD REYNOLD DAVIS/POONAM    DD:  07/24/2022 18:29  DT:  07/24/2022 20:25    Job#:  [id number]    CC:Obi Ennis MD(User)  GRAEME MAHONEY MD

## 2022-07-25 NOTE — OP REPORT
Immediate Post OP Note    PreOp Diagnosis: abdominal pain  Suspected hemoperitoneum  Suspected left hemorrhagic cyst  7 cm left adnexal mass    PostOp Diagnosis: same, bleeding left hemorrhagic cyst, omental adhesions to uterus    Procedure(s):  LAPAROSCOPY, DIAGNOSTIC - Wound Class: Clean  EVACUATION OF HEMOPERITONEUM AND CAUTERIZATION OF HEMORRHAGIC CYST - Wound Class: Clean  LYSIS, ADHESIONS - Wound Class: Clean    Surgeon(s):  CONNER Hayes M.D.    Anesthesiologist/Type of Anesthesia:  Anesthesiologist: Obi Ennis M.D./General    Surgical Staff:  Circulator: Amara Martinez R.N.; Anthony Manriquez R.N.  Scrub Person: Earnestine Allen    Specimens removed if any:  none    Estimated Blood Loss: less 50 ml  IVF: 1 liter LR  UO: 100 ml    Findings:1 liter blood in pelvis up to the liver, with organized blood around the left ovary, normal uterus, omental adhesions to the uterus, normal right ovary and fallopian tube, bleeding left ovarian hemorrhagic cyst, normal left fallopian tube.   Complications: none

## 2022-07-26 LAB — VIT B6 SERPL-MCNC: 31.6 NMOL/L (ref 20–125)

## 2022-07-27 LAB — VIT B1 BLD-MCNC: 74 NMOL/L (ref 70–180)

## 2022-10-19 ENCOUNTER — HOSPITAL ENCOUNTER (OUTPATIENT)
Facility: MEDICAL CENTER | Age: 32
End: 2022-10-19
Attending: NURSE PRACTITIONER
Payer: COMMERCIAL

## 2022-10-19 ENCOUNTER — OFFICE VISIT (OUTPATIENT)
Dept: URGENT CARE | Facility: PHYSICIAN GROUP | Age: 32
End: 2022-10-19
Payer: COMMERCIAL

## 2022-10-19 VITALS
HEIGHT: 65 IN | OXYGEN SATURATION: 100 % | RESPIRATION RATE: 16 BRPM | BODY MASS INDEX: 35.82 KG/M2 | WEIGHT: 215 LBS | DIASTOLIC BLOOD PRESSURE: 64 MMHG | SYSTOLIC BLOOD PRESSURE: 122 MMHG | TEMPERATURE: 98.3 F | HEART RATE: 62 BPM

## 2022-10-19 DIAGNOSIS — R09.81 NASAL CONGESTION: ICD-10-CM

## 2022-10-19 DIAGNOSIS — H92.09 OTALGIA, UNSPECIFIED LATERALITY: ICD-10-CM

## 2022-10-19 DIAGNOSIS — J02.9 SORE THROAT: ICD-10-CM

## 2022-10-19 DIAGNOSIS — R06.7 SNEEZING: ICD-10-CM

## 2022-10-19 DIAGNOSIS — J06.9 UPPER RESPIRATORY INFECTION, ACUTE: Primary | ICD-10-CM

## 2022-10-19 LAB
EXTERNAL QUALITY CONTROL: NORMAL
INT CON NEG: NEGATIVE
INT CON NEG: NEGATIVE
INT CON POS: POSITIVE
INT CON POS: POSITIVE
S PYO AG THROAT QL: NEGATIVE
SARS-COV+SARS-COV-2 AG RESP QL IA.RAPID: NEGATIVE

## 2022-10-19 PROCEDURE — 87880 STREP A ASSAY W/OPTIC: CPT | Performed by: NURSE PRACTITIONER

## 2022-10-19 PROCEDURE — U0005 INFEC AGEN DETEC AMPLI PROBE: HCPCS

## 2022-10-19 PROCEDURE — U0003 INFECTIOUS AGENT DETECTION BY NUCLEIC ACID (DNA OR RNA); SEVERE ACUTE RESPIRATORY SYNDROME CORONAVIRUS 2 (SARS-COV-2) (CORONAVIRUS DISEASE [COVID-19]), AMPLIFIED PROBE TECHNIQUE, MAKING USE OF HIGH THROUGHPUT TECHNOLOGIES AS DESCRIBED BY CMS-2020-01-R: HCPCS

## 2022-10-19 PROCEDURE — 99214 OFFICE O/P EST MOD 30 MIN: CPT | Mod: 25,CS | Performed by: NURSE PRACTITIONER

## 2022-10-19 PROCEDURE — 87426 SARSCOV CORONAVIRUS AG IA: CPT | Performed by: NURSE PRACTITIONER

## 2022-10-19 PROCEDURE — 69210 REMOVE IMPACTED EAR WAX UNI: CPT | Mod: LT | Performed by: NURSE PRACTITIONER

## 2022-10-19 ASSESSMENT — FIBROSIS 4 INDEX: FIB4 SCORE: 0.67

## 2022-10-19 NOTE — LETTER
October 19, 2022       Patient: Maggy Solis   YOB: 1990   Date of Visit: 10/19/2022     To Whom It May Concern:    A concern for COVID-19 was identified in urgent care today and testing was done.  We are asking that you excuse work absences while following the self-isolation protocol per the Center for Disease Control (CDC) guidelines.      Please  follow the the CDC guidelines for return to work or being around other people.     In general repeat testing is not necessary if symptoms have improved and generally not recommended by the CDC.     This is the only note that will be provided from [facility] for this illness.   ]        Rica Wheeler, AMartyP.N.  Electronically Signed

## 2022-10-19 NOTE — PROGRESS NOTES
Maggy Solis is a 32 y.o. female who presents for Coronavirus Screening (Sore throat, cough and sneezing x 1 day)      HPI  This is a new problem. Maggy Solis is a 32 y.o. patient who presents to urgent care with c/o: Request COVID screening.  She has been experiencing 1 day of cough, sneezing, sore throat.  She is also having pain in her left ear.  Her hearing feels less than normal in her left ear.  She was told that she cannot come back to work until she gets COVID test.  She denies fever, chills, body aches, nausea, vomiting.  Treatments tried: None  Denies exposure to ill persons  No other aggravating or alleviating factors.       ROS See HPI    Allergies:     No Known Allergies    PMSFS Hx:  Past Medical History:   Diagnosis Date    PCO (polycystic ovaries) 2012    Sickle cell trait (HCC) 2012     Past Surgical History:   Procedure Laterality Date    AR LAP,DIAGNOSTIC ABDOMEN N/A 2022    Procedure: LAPAROSCOPY, DIAGNOSTIC;  Surgeon: Randa Whalen M.D.;  Location: [facility];  Service: Gynecology    AR DRAINAGE OF HEMATOMA/FLUID N/A 2022    Procedure: EVACUATION OF HEMOPERITONEUM AND CAUTERIZATION OF HEMORRHAGIC CYST;  Surgeon: Randa Whalen M.D.;  Location: [facility];  Service: Gynecology    LYSIS ADHESIONS GENERAL N/A 2022    Procedure: LYSIS, ADHESIONS;  Surgeon: Randa Whalen M.D.;  Location: [facility];  Service: Gynecology    AR  DELIVERY ONLY Bilateral 2019    Procedure:  SECTION, PRIMARY;  Surgeon: Ignacia Gonzalez M.D.;  Location: LABOR AND DELIVERY;  Service: Labor and Delivery     Family History   Problem Relation Age of Onset    Diabetes Mother      Social History     Tobacco Use    Smoking status: Passive Smoke Exposure - Never Smoker    Smokeless tobacco: Never    Tobacco comments:     both parents smoked and boyfriend smokes   Substance Use Topics    Alcohol use: Yes     Alcohol/week:  "2.4 oz     Types: 4 Shots of liquor per week       Problems:   Patient Active Problem List   Diagnosis    PCO (polycystic ovaries)    Sickle cell trait (HCC)    Labor and delivery indication for care or intervention    Meconium aspiration    Nuchal cord affecting delivery    Fetal intolerance to labor, delivered, current hospitalization    Postoperative pain    Hyperlipidemia       Medications:   Current Outpatient Medications on File Prior to Visit   Medication Sig Dispense Refill    asa/apap/caffeine (EXCEDRIN) 250-250-65 MG Tab Take 1 Tablet by mouth every 6 hours as needed for Headache.      ibuprofen (MOTRIN) 400 MG Tab Take 400 mg by mouth every 6 hours as needed for Headache.      metroNIDAZOLE (FLAGYL) 500 MG Tab Take 500 mg by mouth 2 times a day. 7 day course prescribed 6/22/2022.      ibuprofen (MOTRIN) 600 MG Tab Take 1 Tablet by mouth every 6 hours as needed for Moderate Pain. 60 Tablet 1    docusate sodium (COLACE) 100 MG Cap Take 1 Capsule by mouth 2 times a day. 60 Capsule 1    docusate sodium (COLACE) 100 MG Cap Take 1 Capsule by mouth 2 times a day. 60 Capsule 1    ibuprofen (MOTRIN) 600 MG Tab Take 1 Tablet by mouth every 6 hours as needed for Moderate Pain. 60 Tablet 1    ethinyl estradiol-etonogestrel (NUVARING) 0.12-0.015 MG/24HR vaginal ring Insert 1 Each into the vagina every 28 days. Leave in place for 3 weeks (21 days), then remove for 1 week before inserting new ring.       No current facility-administered medications on file prior to visit.          Objective:     /64 (BP Location: Left arm, Patient Position: Sitting, BP Cuff Size: Large adult)   Pulse 62   Temp 36.8 °C (98.3 °F) (Temporal)   Resp 16   Ht 1.651 m (5' 5\")   Wt 97.5 kg (215 lb)   SpO2 100%   BMI 35.78 kg/m²     Physical Exam  Vitals and nursing note reviewed.   Constitutional:       General: She is not in acute distress.     Appearance: Normal appearance. She is well-developed. She is not toxic-appearing. "   HENT:      Head: Normocephalic.      Right Ear: Hearing, tympanic membrane, ear canal and external ear normal.      Left Ear: Hearing, ear canal and external ear normal. No swelling.  No middle ear effusion. There is impacted cerumen. Tympanic membrane is erythematous. Tympanic membrane is not bulging.      Ears:      Comments:   Cerumen Removal Procedure:  I have discussed the potential risks of this procedure including but not limited to mild discomfort with a sensation of ear fullness and wetness, dizziness, ear canal inflammation, ear canal abrasion with bleeding, and/or ear drum perforation.Patient is aware and consents to the procedure.  Cerumen removed easily with currettage.  Curettage was done by provider  Pt tolerated well. No adverse effects.          Nose: Mucosal edema and rhinorrhea (clear) present.      Right Sinus: No frontal sinus tenderness.      Left Sinus: No frontal sinus tenderness.      Mouth/Throat:      Pharynx: Uvula midline. Posterior oropharyngeal erythema present. No oropharyngeal exudate.      Tonsils: No tonsillar abscesses.   Eyes:      General: Lids are normal.      Conjunctiva/sclera: Conjunctivae normal.   Neck:      Trachea: Trachea and phonation normal.   Cardiovascular:      Rate and Rhythm: Normal rate and regular rhythm.      Pulses: Normal pulses.      Heart sounds: Normal heart sounds.   Pulmonary:      Effort: Pulmonary effort is normal. No respiratory distress.      Breath sounds: Normal breath sounds.   Musculoskeletal:         General: Normal range of motion.      Cervical back: Full passive range of motion without pain, normal range of motion and neck supple.   Lymphadenopathy:      Head:      Right side of head: No tonsillar adenopathy.      Left side of head: No tonsillar adenopathy.      Cervical: No cervical adenopathy.      Upper Body:      Right upper body: No supraclavicular adenopathy.      Left upper body: No supraclavicular adenopathy.   Skin:     General:  Skin is warm and dry.      Capillary Refill: Capillary refill takes less than 2 seconds.      Findings: No rash.   Neurological:      Mental Status: She is alert and oriented to person, place, and time.      Deep Tendon Reflexes: Reflexes are normal and symmetric.   Psychiatric:         Mood and Affect: Mood normal.         Speech: Speech normal.         Behavior: Behavior normal.         Assessment /Associated Orders:      1. Sore throat  POCT Rapid Strep A    POCT SARS-COV Antigen GIANCARLO (Symptomatic only)      2. Sneezing  POCT SARS-COV Antigen GIANCARLO (Symptomatic only)      3. Otalgia, unspecified laterality        4. Nasal congestion            Medical Decision Making:    Pt is clinically stable at today's acute urgent care visit.  No acute distress noted. Appropriate for outpatient care at this time.   Acute problem today .   Negative strep today.   Negative covid antigen today.   Covid PCR- collected today. Results pending   Quarantine per the CDC guidelines - pt educated on current recommendations and given [facility] website for further information.   Discussed that this illness was viral in nature. Did not see any evidence of a bacterial process. OTC medications can be used for symptomatic relief of symptoms. Follow manufacturers guidelines for dosing and instructions.   OTC antihistamine of choice. Follow manufactures dosing and safety guidelines.   Salt water gargles BID and prn. Suggested 1/4 to 1/2 teaspoon (1.5 to 3.0 g) of salt per one cup (8 ounces or 250 mL) of warm water.   OTC throat analgesic spray or lozenge of choice prn throat pain. Dosage and directions per       Discussed Dx, management options (risks,benefits, and alternatives to planned treatment), natural progression and supportive care.  Expressed understanding and the treatment plan was agreed upon.   Questions were encouraged and answered   Return to urgent care prn if new or worsening sx or if there is no improvement in condition prn.     Educated in Red flags and indications to immediately call 911 or present to the Emergency Department.       Time I spent evaluating Maggy Solis in urgent care today was 31  minutes. This time includes preparing for visit, reviewing any pertinent notes or test results, counseling/education, exam, obtaining HPI, interpretation of lab tests, medication management and documentation as indicated above.Time does not include separately billable procedures noted .       Please note that this dictation was created using voice recognition software. I have worked with consultants from the vendor as well as technical experts from [facility] to optimize the interface. I have made every reasonable attempt to correct obvious errors, but I expect that there are errors of grammar and possibly content that I did not discover before finalizing the note.  This note was electronically signed by provider

## 2022-10-20 DIAGNOSIS — J06.9 UPPER RESPIRATORY INFECTION, ACUTE: ICD-10-CM

## 2022-10-20 LAB
SARS-COV-2 RNA RESP QL NAA+PROBE: NOTDETECTED
SPECIMEN SOURCE: NORMAL

## 2022-12-07 ENCOUNTER — HOSPITAL ENCOUNTER (OUTPATIENT)
Facility: MEDICAL CENTER | Age: 32
End: 2022-12-07
Attending: PHYSICIAN ASSISTANT
Payer: COMMERCIAL

## 2022-12-07 ENCOUNTER — OFFICE VISIT (OUTPATIENT)
Dept: URGENT CARE | Facility: PHYSICIAN GROUP | Age: 32
End: 2022-12-07
Payer: COMMERCIAL

## 2022-12-07 VITALS
OXYGEN SATURATION: 99 % | TEMPERATURE: 98.6 F | HEART RATE: 68 BPM | SYSTOLIC BLOOD PRESSURE: 134 MMHG | HEIGHT: 65 IN | WEIGHT: 227.8 LBS | RESPIRATION RATE: 16 BRPM | DIASTOLIC BLOOD PRESSURE: 90 MMHG | BODY MASS INDEX: 37.95 KG/M2

## 2022-12-07 DIAGNOSIS — Z11.3 SCREEN FOR STD (SEXUALLY TRANSMITTED DISEASE): ICD-10-CM

## 2022-12-07 DIAGNOSIS — N92.6 MENSTRUAL IRREGULARITY: ICD-10-CM

## 2022-12-07 LAB
APPEARANCE UR: NORMAL
BILIRUB UR STRIP-MCNC: NEGATIVE MG/DL
COLOR UR AUTO: NORMAL
GLUCOSE UR STRIP.AUTO-MCNC: NEGATIVE MG/DL
INT CON NEG: NEGATIVE
INT CON POS: POSITIVE
KETONES UR STRIP.AUTO-MCNC: NORMAL MG/DL
LEUKOCYTE ESTERASE UR QL STRIP.AUTO: NORMAL
NITRITE UR QL STRIP.AUTO: NEGATIVE
PH UR STRIP.AUTO: 6 [PH] (ref 5–8)
POC URINE PREGNANCY TEST: NEGATIVE
PROT UR QL STRIP: 100 MG/DL
RBC UR QL AUTO: NORMAL
SP GR UR STRIP.AUTO: >=1.03
UROBILINOGEN UR STRIP-MCNC: 1 MG/DL

## 2022-12-07 PROCEDURE — 81025 URINE PREGNANCY TEST: CPT | Performed by: PHYSICIAN ASSISTANT

## 2022-12-07 PROCEDURE — 87491 CHLMYD TRACH DNA AMP PROBE: CPT

## 2022-12-07 PROCEDURE — 87591 N.GONORRHOEAE DNA AMP PROB: CPT

## 2022-12-07 PROCEDURE — 87660 TRICHOMONAS VAGIN DIR PROBE: CPT

## 2022-12-07 PROCEDURE — 99213 OFFICE O/P EST LOW 20 MIN: CPT | Performed by: PHYSICIAN ASSISTANT

## 2022-12-07 PROCEDURE — 87480 CANDIDA DNA DIR PROBE: CPT

## 2022-12-07 PROCEDURE — 87510 GARDNER VAG DNA DIR PROBE: CPT

## 2022-12-07 PROCEDURE — 81002 URINALYSIS NONAUTO W/O SCOPE: CPT | Performed by: PHYSICIAN ASSISTANT

## 2022-12-07 RX ORDER — ACETAMINOPHEN AND CODEINE PHOSPHATE 120; 12 MG/5ML; MG/5ML
1 SOLUTION ORAL DAILY
COMMUNITY
End: 2024-02-24

## 2022-12-07 ASSESSMENT — ENCOUNTER SYMPTOMS
NAUSEA: 0
MYALGIAS: 0
VOMITING: 0
FEVER: 0
ABDOMINAL PAIN: 0
CHILLS: 0
FLANK PAIN: 0
DIZZINESS: 0
HEADACHES: 0

## 2022-12-07 ASSESSMENT — FIBROSIS 4 INDEX: FIB4 SCORE: 0.67

## 2022-12-07 NOTE — PROGRESS NOTES
Subjective:     CHIEF COMPLAINT  Chief Complaint   Patient presents with    Sexually Transmitted Diseases     Multiple partners, not knowingly exposed  Light and heavy bleeding x17 days       HP  Maggy Solis is a very pleasant 32 y.o. female who presents to the clinic for STD screening.  Patient is in an open relationship with multiple partners.  Currently not experiencing any vaginal discharge or pelvic pain.  States she has had intermittent spotting and bleeding since starting her new birth control.  Currently taking minipill.  Past medical history significant for polycystic ovaries.  Not experiencing any dysuria or urinary frequency.  No fevers, chills or myalgias.  No GI complaints.    REVIEW OF SYSTEMS  Review of Systems   Constitutional:  Negative for chills and fever.   Gastrointestinal:  Negative for abdominal pain, nausea and vomiting.   Genitourinary:  Negative for dysuria, flank pain, frequency, hematuria and urgency.        Vaginal bleeding and spotting.  No vaginal discharge.  No pelvic pain.   Musculoskeletal:  Negative for myalgias.   Skin:  Negative for rash.   Neurological:  Negative for dizziness and headaches.     PAST MEDICAL HISTORY  Patient Active Problem List    Diagnosis Date Noted    Hyperlipidemia 2020    Labor and delivery indication for care or intervention 2019    Meconium aspiration 2019    Nuchal cord affecting delivery 2019    Fetal intolerance to labor, delivered, current hospitalization 2019    Postoperative pain 2019    PCO (polycystic ovaries) 2012    Sickle cell trait (HCC) 2012       SURGICAL HISTORY   has a past surgical history that includes  delivery only (Bilateral, 2019); lap,diagnostic abdomen (N/A, 2022); drainage of hematoma/fluid (N/A, 2022); and lysis adhesions general (N/A, 2022).    ALLERGIES  No Known Allergies    CURRENT MEDICATIONS  Home Medications       Reviewed by Jose E Argueta  "KAIDEN (Physician Assistant) on 12/07/22 at 1126  Med List Status: <None>     Medication Last Dose Status   asa/apap/caffeine (EXCEDRIN) 250-250-65 MG Tab Taking Active   docusate sodium (COLACE) 100 MG Cap Taking Active   docusate sodium (COLACE) 100 MG Cap Taking Active   ethinyl estradiol-etonogestrel (NUVARING) 0.12-0.015 MG/24HR vaginal ring Not Taking Active   ibuprofen (MOTRIN) 400 MG Tab Taking Active   ibuprofen (MOTRIN) 600 MG Tab Taking Active   ibuprofen (MOTRIN) 600 MG Tab Taking Active   metroNIDAZOLE (FLAGYL) 500 MG Tab Not Taking Active   norethindrone (CHRISTIAN-BE) 0.35 MG tablet Taking Active                    SOCIAL HISTORY  Social History     Tobacco Use    Smoking status: Never     Passive exposure: Yes    Smokeless tobacco: Never    Tobacco comments:     both parents smoked and boyfriend smokes   Vaping Use    Vaping Use: Never used   Substance and Sexual Activity    Alcohol use: Yes     Alcohol/week: 2.4 oz     Types: 4 Shots of liquor per week    Drug use: No    Sexual activity: Yes     Partners: Male     Birth control/protection: Ring       FAMILY HISTORY  Family History   Problem Relation Age of Onset    Diabetes Mother           Objective:     VITAL SIGNS: BP (!) 134/90 (BP Location: Left arm, Patient Position: Sitting, BP Cuff Size: Adult)   Pulse 68   Temp 37 °C (98.6 °F) (Temporal)   Resp 16   Ht 1.651 m (5' 5\")   Wt 103 kg (227 lb 12.8 oz)   SpO2 99%   BMI 37.91 kg/m²     PHYSICAL EXAM  Physical Exam  Constitutional:       General: She is not in acute distress.     Appearance: Normal appearance. She is not ill-appearing, toxic-appearing or diaphoretic.   HENT:      Head: Normocephalic and atraumatic.   Eyes:      Conjunctiva/sclera: Conjunctivae normal.   Cardiovascular:      Rate and Rhythm: Normal rate and regular rhythm.      Pulses: Normal pulses.      Heart sounds: Normal heart sounds.   Pulmonary:      Effort: Pulmonary effort is normal.      Breath sounds: Normal breath " sounds.   Abdominal:      General: Bowel sounds are normal.      Palpations: Abdomen is soft.      Tenderness: There is no abdominal tenderness. There is no right CVA tenderness, left CVA tenderness, guarding or rebound.   Musculoskeletal:         General: Normal range of motion.      Cervical back: Normal range of motion. No muscular tenderness.   Skin:     General: Skin is warm and dry.   Neurological:      General: No focal deficit present.      Mental Status: She is alert and oriented to person, place, and time. Mental status is at baseline.   Psychiatric:         Mood and Affect: Mood normal.         Thought Content: Thought content normal.       Assessment/Plan:     1. Menstrual irregularity  - POCT Urinalysis  - POCT Pregnancy  - VAGINAL PATHOGENS DNA PANEL; Future  - Chlamydia/GC, PCR (Urine); Future    2. Screen for STD (sexually transmitted disease)  - POCT Urinalysis  - POCT Pregnancy  - VAGINAL PATHOGENS DNA PANEL; Future  - Chlamydia/GC, PCR (Urine); Future    Other orders  - norethindrone (CHRISTIAN-BE) 0.35 MG tablet; Take 1 Tablet by mouth every day.      MDM/Comments:    We will send out for vaginal pathogen and GC chlamydia testing.  I will follow-up once available.  Patient has had abnormal menstrual cycle since starting her new birth control 3 months ago.  She is going to call and follow-up with her OB/GYN to discuss further options.  No red flag symptoms on exam.  Return/ED precautions discussed.    Differential diagnosis, natural history, supportive care, and indications for immediate follow-up discussed. All questions answered. Patient agrees with the plan of care.    Follow-up as needed if symptoms worsen or fail to improve to PCP, Urgent care or Emergency Room.    I have personally reviewed prior external notes and test results pertinent to today's visit.  I have independently reviewed and interpreted all diagnostics ordered during this urgent care acute visit.   Discussed management options  (risks,benefits, and alternatives to treatment). Pt expresses understanding and the treatment plan was agreed upon. Questions were encouraged and answered to pt's satisfaction.    Please note that this dictation was created using voice recognition software. I have made a reasonable attempt to correct obvious errors, but I expect that there are errors of grammar and possibly content that I did not discover before finalizing the note.

## 2022-12-08 DIAGNOSIS — N92.6 MENSTRUAL IRREGULARITY: ICD-10-CM

## 2022-12-08 DIAGNOSIS — Z11.3 SCREEN FOR STD (SEXUALLY TRANSMITTED DISEASE): ICD-10-CM

## 2022-12-08 LAB
C TRACH DNA GENITAL QL NAA+PROBE: NEGATIVE
CANDIDA DNA VAG QL PROBE+SIG AMP: NEGATIVE
G VAGINALIS DNA VAG QL PROBE+SIG AMP: POSITIVE
N GONORRHOEA DNA GENITAL QL NAA+PROBE: NEGATIVE
SPECIMEN SOURCE: NORMAL
T VAGINALIS DNA VAG QL PROBE+SIG AMP: NEGATIVE

## 2022-12-10 DIAGNOSIS — N76.0 BV (BACTERIAL VAGINOSIS): ICD-10-CM

## 2022-12-10 DIAGNOSIS — B96.89 BV (BACTERIAL VAGINOSIS): ICD-10-CM

## 2022-12-10 RX ORDER — METRONIDAZOLE 500 MG/1
500 TABLET ORAL 2 TIMES DAILY
Qty: 14 TABLET | Refills: 0 | Status: SHIPPED | OUTPATIENT
Start: 2022-12-10 | End: 2022-12-17

## 2022-12-18 ENCOUNTER — HOSPITAL ENCOUNTER (EMERGENCY)
Facility: MEDICAL CENTER | Age: 32
End: 2022-12-18
Attending: EMERGENCY MEDICINE
Payer: COMMERCIAL

## 2022-12-18 VITALS
TEMPERATURE: 97.2 F | OXYGEN SATURATION: 99 % | HEIGHT: 65 IN | WEIGHT: 219.8 LBS | RESPIRATION RATE: 18 BRPM | HEART RATE: 90 BPM | BODY MASS INDEX: 36.62 KG/M2 | SYSTOLIC BLOOD PRESSURE: 154 MMHG | DIASTOLIC BLOOD PRESSURE: 96 MMHG

## 2022-12-18 DIAGNOSIS — I10 HYPERTENSION, UNSPECIFIED TYPE: ICD-10-CM

## 2022-12-18 PROCEDURE — 99282 EMERGENCY DEPT VISIT SF MDM: CPT

## 2022-12-18 RX ORDER — AMLODIPINE BESYLATE 5 MG/1
5 TABLET ORAL DAILY
Qty: 30 TABLET | Refills: 0 | Status: SHIPPED | OUTPATIENT
Start: 2022-12-18

## 2022-12-18 ASSESSMENT — FIBROSIS 4 INDEX: FIB4 SCORE: 0.67

## 2022-12-19 NOTE — DISCHARGE INSTRUCTIONS
You are seen in the emergency part for high blood pressure.  You are being started on your amlodipine.  Please take this and continue to monitor your blood pressure.  If you find that through diet, reductions in stress, and meditation your blood pressure is controlled without amlodipine, then you may stop taking it.    Please follow-up with your primary care provider.    Please return to the emergency department or seek medical attention if you develop:  Severe progressive headache, chest pain, difficulty breathing, any other new or concerning findings

## 2022-12-19 NOTE — ED TRIAGE NOTES
"Chief Complaint   Patient presents with    Hypertension     Hx of hypertension, pt taken off blood pressure medication 4-5 months ago by PCP.  Pt reports headache today and is concerned since it has been elevated.     Pt BIB self for above complaint. Pt educated to alert staff of any changes or concerns.    BP (!) 145/99   Pulse 95   Temp 36.1 °C (97 °F) (Temporal)   Resp 16   Ht 1.651 m (5' 5\")   Wt 99.7 kg (219 lb 12.8 oz)   LMP 12/04/2022 (Approximate)   SpO2 100%   BMI 36.58 kg/m²     "

## 2022-12-19 NOTE — ED PROVIDER NOTES
ED Provider Note        Means of Arrival: Private vehicle  History obtained from: Patient, medical record    CHIEF COMPLAINT  Chief Complaint   Patient presents with    Hypertension     Hx of hypertension, pt taken off blood pressure medication 4-5 months ago by PCP.  Pt reports headache today and is concerned since it has been elevated.       HPI  Maggy Solis is a 32 y.o. female who presents with high blood pressure.  The patient reports she has been under a lot of stress recently.  She has been having blood pressures running 150s to 160s regularly, as high as 180/113 today.  She also has been having intermittent headaches over the past 2 weeks.  The generally improve with over-the-counter pain medications and sleep.  The headache is not consistent.  No photo or phonophobia.  No alleviating factors other than sleep and over-the-counter pain medications.  No aggravating factors.  No chest pain or shortness of breath.  She denies any visual changes, difficulty walking.  She reports minimal headache at this time.  She is presenting today due to her persistent high blood pressure.  She reports she was previously on multiple blood pressure medications but through lifestyle modification has been able to be weaned off of these.  Has been undergoing a lot of stress recently which she believes is contributing to her high blood pressure currently.    REVIEW OF SYSTEMS    CONSTITUTIONAL:  No fever.  CARDIOVASCULAR:  No chest discomfort.  RESPIRATORY:  No pleuritic chest pain.  GASTROINTESTINAL:  No abdominal pain.  See HPI for further details.     PAST MEDICAL HISTORY  Past Medical History:   Diagnosis Date    PCO (polycystic ovaries) 4/5/2012    Sickle cell trait (HCC) 4/5/2012       FAMILY HISTORY  Family History   Problem Relation Age of Onset    Diabetes Mother        SOCIAL HISTORY   reports that she has never smoked. She has been exposed to tobacco smoke. She has never used smokeless tobacco. She reports current  "alcohol use of about 2.4 oz per week. She reports that she does not use drugs.    SURGICAL HISTORY  Past Surgical History:   Procedure Laterality Date    [place] LAP,DIAGNOSTIC ABDOMEN N/A 2022    Procedure: LAPAROSCOPY, DIAGNOSTIC;  Surgeon: Randa Whalen M.D.;  Location: [facility];  Service: Gynecology    [place] DRAINAGE OF HEMATOMA/FLUID N/A 2022    Procedure: EVACUATION OF HEMOPERITONEUM AND CAUTERIZATION OF HEMORRHAGIC CYST;  Surgeon: Randa Whalen M.D.;  Location: SURGERY [facility];  Service: Gynecology    LYSIS ADHESIONS GENERAL N/A 2022    Procedure: LYSIS, ADHESIONS;  Surgeon: Randa Whalen M.D.;  Location: SURGERY [facility];  Service: Gynecology    [place]  DELIVERY ONLY Bilateral 2019    Procedure:  SECTION, PRIMARY;  Surgeon: Ignacia Gonzalez M.D.;  Location: LABOR AND DELIVERY;  Service: Labor and Delivery       CURRENT MEDICATIONS  Home Medications       Reviewed by Sarah Torres R.N. (Registered Nurse) on 22 at 1613  Med List Status: Not Addressed     Medication Last Dose Status   asa/apap/caffeine (EXCEDRIN) 250-250-65 MG Tab  Active   docusate sodium (COLACE) 100 MG Cap  Active   docusate sodium (COLACE) 100 MG Cap  Active   ethinyl estradiol-etonogestrel (NUVARING) 0.12-0.015 MG/24HR vaginal ring  Active   ibuprofen (MOTRIN) 400 MG Tab  Active   ibuprofen (MOTRIN) 600 MG Tab  Active   ibuprofen (MOTRIN) 600 MG Tab  Active   metroNIDAZOLE (FLAGYL) 500 MG Tab  Active   norethindrone (CHRISTIAN-BE) 0.35 MG tablet  Active                    ALLERGIES  No Known Allergies    PHYSICAL EXAM  VITAL SIGNS: BP (!) 154/96   Pulse 90   Temp 36.2 °C (97.2 °F) (Temporal)   Resp 18   Ht 1.651 m (5' 5\")   Wt 99.7 kg (219 lb 12.8 oz)   LMP 2022 (Approximate)   SpO2 99%   BMI 36.58 kg/m²    Gen: alert, no acute distress  HENT: ATNC  Eyes: normal conjunctiva  Resp: No respiratory distress, clear to auscultation bilaterally  CV: No JVD, " RRR, no murmurs, rubs, gallops  Abd: Non-distended  Extremitie alert and oriented. Cranial nerves II through XII intact, 5+ strength in all extremities, sensation intact in all extremities, speech fluent, normal gait, cerebellar function intact.  S: No deformity   neuro:      COURSE & MEDICAL DECISION MAKING  Pertinent Labs & Imaging studies reviewed. (See chart for details)    Patient presents with recurrence of her elevated blood pressure.  She does have mild headache but no clinical signs of suggest meningitis, subarachnoid hemorrhage, venous sinus thrombosis, intracranial mass.  She demonstrates no symptoms that would be suggestive of MT ES, ACS.  Patient will be restarted on her amlodipine.  She was counseled on return precautions, anticipatory guidance.    Appropriate PPE were worn during this encounter.    FINAL IMPRESSION  1. Hypertension, unspecified type         DISPOSITION:  Patient will be discharged home in stable condition.    FOLLOW UP:  Steph Robison, F.N.P.  [address] [facility] [id number] [phone]    Schedule an appointment as soon as possible for a visit       [facility], Emergency Dept  [address] [place] [id number] [phone]    If symptoms worsen      OUTPATIENT MEDICATIONS:  New Prescriptions    AMLODIPINE (NORVASC) 5 MG TAB    Take 1 Tablet by mouth every day.          This dictation was created using voice recognition software. The accuracy of the dictation is limited to the abilities of the software. I expect there may be some errors of grammar and possibly content. The nursing notes were reviewed and certain aspects of this information were incorporated into this note.

## 2022-12-19 NOTE — ED NOTES
Dr. Alvarado at bedside.  
Patient discharged home per ERP.  Discharge teaching and education discussed with patient. POC discussed.   Patient verbalized understanding of discharge teaching and education. No other questions at this time.     Norvasc Rx given to patient.     VSS. Patient alert and oriented. Patient arranged ride for self. Able to ambulate off unit safely with steady gait.    
Pt ambulated to without assistance to TCS 6 with a steady gait. Up for ERP to see.   
Pt c/o 5/10 headache, started 2 weeks ago as a 3 day migraine, was tapering off but returned today (currently 5/10). BP at home was 180 systolic, which prompted her to come to ED.   
No

## 2023-05-02 ENCOUNTER — HOSPITAL ENCOUNTER (OUTPATIENT)
Facility: MEDICAL CENTER | Age: 33
End: 2023-05-02
Attending: PHYSICIAN ASSISTANT
Payer: COMMERCIAL

## 2023-05-02 ENCOUNTER — OFFICE VISIT (OUTPATIENT)
Dept: URGENT CARE | Facility: CLINIC | Age: 33
End: 2023-05-02
Payer: COMMERCIAL

## 2023-05-02 VITALS
WEIGHT: 207 LBS | RESPIRATION RATE: 16 BRPM | DIASTOLIC BLOOD PRESSURE: 80 MMHG | SYSTOLIC BLOOD PRESSURE: 138 MMHG | HEART RATE: 69 BPM | BODY MASS INDEX: 34.49 KG/M2 | TEMPERATURE: 97.8 F | HEIGHT: 65 IN | OXYGEN SATURATION: 100 %

## 2023-05-02 DIAGNOSIS — Z11.3 SCREENING EXAMINATION FOR STD (SEXUALLY TRANSMITTED DISEASE): ICD-10-CM

## 2023-05-02 PROCEDURE — 87510 GARDNER VAG DNA DIR PROBE: CPT

## 2023-05-02 PROCEDURE — 87660 TRICHOMONAS VAGIN DIR PROBE: CPT

## 2023-05-02 PROCEDURE — 87491 CHLMYD TRACH DNA AMP PROBE: CPT

## 2023-05-02 PROCEDURE — 87591 N.GONORRHOEAE DNA AMP PROB: CPT

## 2023-05-02 PROCEDURE — 87480 CANDIDA DNA DIR PROBE: CPT

## 2023-05-02 PROCEDURE — 99212 OFFICE O/P EST SF 10 MIN: CPT | Performed by: PHYSICIAN ASSISTANT

## 2023-05-02 ASSESSMENT — ENCOUNTER SYMPTOMS
CHILLS: 0
HEADACHES: 0
NAUSEA: 0
ABDOMINAL PAIN: 0
FEVER: 0
VOMITING: 0

## 2023-05-02 ASSESSMENT — FIBROSIS 4 INDEX: FIB4 SCORE: 0.69

## 2023-05-02 NOTE — PROGRESS NOTES
"Subjective     Maggy Solis is a 33 y.o. female who presents with Other (Pt would like to be tested for STDS)            Patient is here requesting STD testing. She denies any active STd symptoms. She has history of recurrent BV. She has lab orders for blood work. She denies chance of pregnancy.       Past Medical History:   Diagnosis Date    PCO (polycystic ovaries) 2012    Sickle cell trait (HCC) 2012       Past Surgical History:   Procedure Laterality Date    [place] LAP,DIAGNOSTIC ABDOMEN N/A 2022    Procedure: LAPAROSCOPY, DIAGNOSTIC;  Surgeon: Randa Whalen M.D.;  Location: [facility]ERY [facility];  Service: Gynecology    [place] DRAINAGE OF HEMATOMA/FLUID N/A 2022    Procedure: EVACUATION OF HEMOPERITONEUM AND CAUTERIZATION OF HEMORRHAGIC CYST;  Surgeon: Randa Whalen M.D.;  Location: [facility]ERY [facility];  Service: Gynecology    LYSIS ADHESIONS GENERAL N/A 2022    Procedure: LYSIS, ADHESIONS;  Surgeon: Randa Whalen M.D.;  Location: [facility]ERY [facility];  Service: Gynecology    NM  DELIVERY ONLY Bilateral 2019    Procedure:  SECTION, PRIMARY;  Surgeon: Ignacia Gonzalez M.D.;  Location: LABOR AND DELIVERY;  Service: Labor and Delivery         Family History   Problem Relation Age of Onset    Diabetes Mother          Patient has no known allergies.      Medications, Allergies, and current problem list reviewed today in Epic    Review of Systems   Constitutional:  Negative for chills, fever and malaise/fatigue.   Gastrointestinal:  Negative for abdominal pain, nausea and vomiting.   Genitourinary:  Negative for dysuria, frequency, hematuria and urgency.   Skin:  Negative for rash.   Neurological:  Negative for headaches.      All other systems reviewed and are negative.         Objective     /80   Pulse 69   Temp 36.6 °C (97.8 °F)   Resp 16   Ht 1.651 m (5' 5\")   Wt 93.9 kg (207 lb)   SpO2 100%   BMI 34.45 kg/m²      Physical " Exam  Constitutional:       General: She is not in acute distress.     Appearance: She is not ill-appearing.   HENT:      Head: Normocephalic and atraumatic.   Eyes:      Conjunctiva/sclera: Conjunctivae normal.   Cardiovascular:      Rate and Rhythm: Normal rate and regular rhythm.   Pulmonary:      Effort: Pulmonary effort is normal. No respiratory distress.      Breath sounds: No stridor. No wheezing.   Genitourinary:     Comments: deferred  Skin:     General: Skin is warm and dry.   Neurological:      General: No focal deficit present.      Mental Status: She is alert and oriented to person, place, and time.   Psychiatric:         Mood and Affect: Mood normal.         Behavior: Behavior normal.         Thought Content: Thought content normal.         Judgment: Judgment normal.                           Assessment & Plan        1. Screening examination for STD (sexually transmitted disease)    - VAGINAL PATHOGENS DNA PANEL; Future  - Chlamydia/GC, PCR (Genital/Anal swab); Future        Check above and change treatment plan accordingly.     Differential diagnoses, Supportive care, and indications for immediate follow-up discussed with patient.   Pathogenesis of diagnosis discussed including typical length and natural progression.   Instructed to return to clinic or nearest emergency department for any change in condition, further concerns, or worsening of symptoms.      The patient demonstrated a good understanding and agreed with the treatment plan.      Gely Miller P.A.-C.

## 2023-05-03 DIAGNOSIS — Z11.3 SCREENING EXAMINATION FOR STD (SEXUALLY TRANSMITTED DISEASE): ICD-10-CM

## 2023-05-03 LAB
C TRACH DNA GENITAL QL NAA+PROBE: NEGATIVE
CANDIDA DNA VAG QL PROBE+SIG AMP: NEGATIVE
G VAGINALIS DNA VAG QL PROBE+SIG AMP: NEGATIVE
N GONORRHOEA DNA GENITAL QL NAA+PROBE: NEGATIVE
SPECIMEN SOURCE: NORMAL
T VAGINALIS DNA VAG QL PROBE+SIG AMP: NEGATIVE

## 2023-05-11 ENCOUNTER — OFFICE VISIT (OUTPATIENT)
Dept: URGENT CARE | Facility: CLINIC | Age: 33
End: 2023-05-11
Payer: COMMERCIAL

## 2023-05-11 ENCOUNTER — HOSPITAL ENCOUNTER (OUTPATIENT)
Facility: MEDICAL CENTER | Age: 33
End: 2023-05-11
Attending: NURSE PRACTITIONER
Payer: COMMERCIAL

## 2023-05-11 VITALS
WEIGHT: 235.2 LBS | HEART RATE: 68 BPM | SYSTOLIC BLOOD PRESSURE: 122 MMHG | HEIGHT: 65 IN | RESPIRATION RATE: 12 BRPM | BODY MASS INDEX: 39.18 KG/M2 | TEMPERATURE: 98.3 F | OXYGEN SATURATION: 99 % | DIASTOLIC BLOOD PRESSURE: 70 MMHG

## 2023-05-11 DIAGNOSIS — N76.0 ACUTE VAGINITIS: ICD-10-CM

## 2023-05-11 LAB
POCT INT CON NEG: NEGATIVE
POCT INT CON POS: POSITIVE
POCT URINE PREGNANCY TEST: NEGATIVE

## 2023-05-11 PROCEDURE — 87510 GARDNER VAG DNA DIR PROBE: CPT

## 2023-05-11 PROCEDURE — 87660 TRICHOMONAS VAGIN DIR PROBE: CPT

## 2023-05-11 PROCEDURE — 99213 OFFICE O/P EST LOW 20 MIN: CPT | Performed by: NURSE PRACTITIONER

## 2023-05-11 PROCEDURE — 3074F SYST BP LT 130 MM HG: CPT | Performed by: NURSE PRACTITIONER

## 2023-05-11 PROCEDURE — 81025 URINE PREGNANCY TEST: CPT | Performed by: NURSE PRACTITIONER

## 2023-05-11 PROCEDURE — 3078F DIAST BP <80 MM HG: CPT | Performed by: NURSE PRACTITIONER

## 2023-05-11 PROCEDURE — 87480 CANDIDA DNA DIR PROBE: CPT

## 2023-05-11 ASSESSMENT — FIBROSIS 4 INDEX: FIB4 SCORE: 0.69

## 2023-05-11 NOTE — PROGRESS NOTES
Chief Complaint   Patient presents with    Vaginitis     Pt has vaginal discomfort, discharge, itchiness x 3 days        HISTORY OF PRESENT ILLNESS: Patient is a pleasant 33 y.o. female who presents to urgent care today with vaginal complaints.  Patient notes that for the last 3 days she has had vaginal itching, irritation, white discharge.  She has a history of both yeast infection and bacterial vaginosis.  She was tested for both last week, resulted negative.  She has not tried medication for symptom relief.    Patient Active Problem List    Diagnosis Date Noted    Hyperlipidemia 06/01/2020    Labor and delivery indication for care or intervention 11/14/2019    Meconium aspiration 11/14/2019    Nuchal cord affecting delivery 11/14/2019    Fetal intolerance to labor, delivered, current hospitalization 11/14/2019    Postoperative pain 11/14/2019    PCO (polycystic ovaries) 04/05/2012    Sickle cell trait ([facility]) 04/05/2012       Allergies:Patient has no known allergies.    Current Outpatient Medications Ordered in Epic   Medication Sig Dispense Refill    amLODIPine (NORVASC) 5 MG Tab Take 1 Tablet by mouth every day. 30 Tablet 0    norethindrone (MICRONOR) 0.35 MG tablet Take 1 Tablet by mouth every day.      asa/apap/caffeine (EXCEDRIN) 250-250-65 MG Tab Take 1 Tablet by mouth every 6 hours as needed for Headache.      ibuprofen (MOTRIN) 400 MG Tab Take 400 mg by mouth every 6 hours as needed for Headache.       No current Epic-ordered facility-administered medications on file.       Past Medical History:   Diagnosis Date    PCO (polycystic ovaries) 4/5/2012    Sickle cell trait (HCC) 4/5/2012       Social History     Tobacco Use    Smoking status: Never     Passive exposure: Yes    Smokeless tobacco: Never    Tobacco comments:     both parents smoked and boyfriend smokes   Vaping Use    Vaping Use: Never used   Substance Use Topics    Alcohol use: Yes     Alcohol/week: 2.4 oz     Types: 4 Shots of liquor per week     " Comment: occ    Drug use: No       Family Status   Relation Name Status    Mo  Alive    Fa   at age 53    Sis  Alive    Bro   at age 35        gunshot wound    MGMo  Alive    MGFa      PGMo      PGFa      Bro  Alive     Family History   Problem Relation Age of Onset    Diabetes Mother        ROS:  Review of Systems   Constitutional: Negative for fever, chills, weight loss, malaise, and fatigue.   HENT: Negative for ear pain, nosebleeds, congestion, sore throat and neck pain.    Eyes: Negative for vision changes.   Neuro: Negative for headache, sensory changes, weakness, seizure, LOC.   Cardiovascular: Negative for chest pain, palpitations, orthopnea and leg swelling.   Respiratory: Negative for cough, sputum production, shortness of breath and wheezing.   Gastrointestinal: Negative for abdominal pain, nausea, vomiting or diarrhea.   Genitourinary: Negative for dysuria, urgency and frequency.  GYN: Positive for vaginal itching, irritation, discharge.  Musculoskeletal: Negative for falls, neck pain, back pain, joint pain, myalgias.   Skin: Negative for rash, diaphoresis.     Exam:  /70 (BP Location: Left arm, Patient Position: Sitting, BP Cuff Size: Large adult)   Pulse 68   Temp 36.8 °C (98.3 °F) (Temporal)   Resp 12   Ht 1.651 m (5' 5\")   Wt 107 kg (235 lb 3.2 oz)   SpO2 99%   General: well-nourished, well-developed female in NAD  Head: normocephalic, atraumatic  Eyes: PERRLA, no conjunctival injection, acuity grossly intact, lids normal.  Ears: normal shape and symmetry, no tenderness, no discharge. External canals are without any significant edema or erythema. Tympanic membranes are without any inflammation, no effusion. Gross auditory acuity is intact.  Nose: symmetrical without tenderness, no discharge.  Mouth/Throat: reasonable hygiene, no erythema, exudates or tonsillar enlargement.  Neck: no masses, range of motion within normal limits, no tracheal " deviation. No obvious thyroid enlargement.   Lymph: no cervical adenopathy. No supraclavicular adenopathy.   Neuro: alert and oriented. Cranial nerves 1-12 grossly intact. No sensory deficit.   Cardiovascular: regular rate and rhythm. No edema.  Pulmonary: no distress. Chest is symmetrical with respiration, no wheezes, crackles, or rhonchi.   Abdomen: soft, non-tender, no guarding, no hepatosplenomegaly.  Musculoskeletal: no clubbing, appropriate muscle tone, gait is stable.  Skin: warm, dry, intact, no clubbing, no cyanosis, no rashes.   Psych: appropriate mood, affect, judgement.       POC pregnancy negative      Assessment/Plan:  1. Acute vaginitis  POCT Pregnancy    VAGINAL PATHOGENS DNA PANEL          Patient presents with vaginitis complaints with a history of Candida and bacterial vaginosis, vaginal pathogen sent for testing, will treat accordingly.  OTC probiotic use encouraged.  May consider OTC Monistat if symptoms worsen.  Supportive care, differential diagnoses, and indications for immediate follow-up discussed with patient.   Pathogenesis of diagnosis discussed including typical length and natural progression.   Instructed to return to clinic or nearest emergency department for any change in condition, further concerns, or worsening of symptoms.  Patient states understanding of the plan of care and discharge instructions.  Instructed to make an appointment, for follow up, with her primary care provider.        Please note that this dictation was created using voice recognition software. I have made every reasonable attempt to correct obvious errors, but I expect that there are errors of grammar and possibly content that I did not discover before finalizing the note. Previous clinic visit encounter reviewed and considered in medical decision making today.           KYLE Nguyen.

## 2023-06-05 ENCOUNTER — HOSPITAL ENCOUNTER (OUTPATIENT)
Facility: MEDICAL CENTER | Age: 33
End: 2023-06-05
Payer: COMMERCIAL

## 2023-06-05 ENCOUNTER — OFFICE VISIT (OUTPATIENT)
Dept: URGENT CARE | Facility: PHYSICIAN GROUP | Age: 33
End: 2023-06-05
Payer: COMMERCIAL

## 2023-06-05 VITALS
BODY MASS INDEX: 38.32 KG/M2 | TEMPERATURE: 97.8 F | OXYGEN SATURATION: 97 % | HEIGHT: 65 IN | DIASTOLIC BLOOD PRESSURE: 80 MMHG | HEART RATE: 60 BPM | SYSTOLIC BLOOD PRESSURE: 112 MMHG | WEIGHT: 230 LBS | RESPIRATION RATE: 16 BRPM

## 2023-06-05 DIAGNOSIS — R39.9 UTI SYMPTOMS: ICD-10-CM

## 2023-06-05 DIAGNOSIS — N76.0 ACUTE VAGINITIS: ICD-10-CM

## 2023-06-05 DIAGNOSIS — Z20.2 ENCOUNTER FOR ASSESSMENT OF STD EXPOSURE: ICD-10-CM

## 2023-06-05 LAB
APPEARANCE UR: NORMAL
BILIRUB UR STRIP-MCNC: NORMAL MG/DL
COLOR UR AUTO: YELLOW
GLUCOSE UR STRIP.AUTO-MCNC: NEGATIVE MG/DL
KETONES UR STRIP.AUTO-MCNC: 15 MG/DL
LEUKOCYTE ESTERASE UR QL STRIP.AUTO: NORMAL
NITRITE UR QL STRIP.AUTO: NEGATIVE
PH UR STRIP.AUTO: 5.5 [PH] (ref 5–8)
PROT UR QL STRIP: NEGATIVE MG/DL
RBC UR QL AUTO: NORMAL
SP GR UR STRIP.AUTO: >=1.03
UROBILINOGEN UR STRIP-MCNC: 2 MG/DL

## 2023-06-05 PROCEDURE — 87491 CHLMYD TRACH DNA AMP PROBE: CPT

## 2023-06-05 PROCEDURE — 87480 CANDIDA DNA DIR PROBE: CPT

## 2023-06-05 PROCEDURE — 87591 N.GONORRHOEAE DNA AMP PROB: CPT

## 2023-06-05 PROCEDURE — 3079F DIAST BP 80-89 MM HG: CPT

## 2023-06-05 PROCEDURE — 87086 URINE CULTURE/COLONY COUNT: CPT

## 2023-06-05 PROCEDURE — 81002 URINALYSIS NONAUTO W/O SCOPE: CPT

## 2023-06-05 PROCEDURE — 87660 TRICHOMONAS VAGIN DIR PROBE: CPT

## 2023-06-05 PROCEDURE — 87077 CULTURE AEROBIC IDENTIFY: CPT

## 2023-06-05 PROCEDURE — 99214 OFFICE O/P EST MOD 30 MIN: CPT

## 2023-06-05 PROCEDURE — 3074F SYST BP LT 130 MM HG: CPT

## 2023-06-05 PROCEDURE — 87186 SC STD MICRODIL/AGAR DIL: CPT

## 2023-06-05 PROCEDURE — 87510 GARDNER VAG DNA DIR PROBE: CPT

## 2023-06-05 RX ORDER — METRONIDAZOLE 7.5 MG/G
1 GEL VAGINAL
Qty: 5 EACH | Refills: 0 | Status: SHIPPED | OUTPATIENT
Start: 2023-06-05 | End: 2023-06-10

## 2023-06-05 ASSESSMENT — FIBROSIS 4 INDEX: FIB4 SCORE: 0.69

## 2023-06-06 DIAGNOSIS — N76.0 ACUTE VAGINITIS: ICD-10-CM

## 2023-06-06 DIAGNOSIS — R39.9 UTI SYMPTOMS: ICD-10-CM

## 2023-06-06 NOTE — PROGRESS NOTES
"Subjective:   Maggy Solis is a 33 y.o. female who presents for Vaginal Itching (X 5-6 days with discomfort and a little discharge)      HPI:    Patient presents to urgent care with concerns of vaginal itching, discharge  Reports this has been ongoing and chronic issue for patient over the last 12 months.  Most recent episode started 5 to 6 days ago  She states she has tried to treat herself with Monistat over-the-counter, over-the-counter medications for BV  She reports recurrent issues with BV and yeast infections  She states current vaginal itching and odor is similar to BV in the past  States her discharge is white, has an odor  She would also like to be evaluated for STDs  She reports unprotected sex with one primary partner and engages in \"Swinger lifestyle\" with men and women.  Denies dysuria, urinary frequency, urinary retention  No fever, chills.  No flank pain.  No nausea/vomiting.  No hematuria.    No suprapubic pain.  No malodorous urine.  Denies recent UTI or antibiotic use.      ROS As above in HPI    Medications:    Current Outpatient Medications on File Prior to Visit   Medication Sig Dispense Refill    amLODIPine (NORVASC) 5 MG Tab Take 1 Tablet by mouth every day. 30 Tablet 0    norethindrone (MICRONOR) 0.35 MG tablet Take 1 Tablet by mouth every day.      asa/apap/caffeine (EXCEDRIN) 250-250-65 MG Tab Take 1 Tablet by mouth every 6 hours as needed for Headache.      ibuprofen (MOTRIN) 400 MG Tab Take 400 mg by mouth every 6 hours as needed for Headache.       No current facility-administered medications on file prior to visit.        Allergies:   Patient has no known allergies.    Problem List:   Patient Active Problem List   Diagnosis    PCO (polycystic ovaries)    Sickle cell trait (HCC)    Labor and delivery indication for care or intervention    Meconium aspiration    Nuchal cord affecting delivery    Fetal intolerance to labor, delivered, current hospitalization    Postoperative pain    " "Hyperlipidemia        Surgical History:  Past Surgical History:   Procedure Laterality Date    ND LAP,DIAGNOSTIC ABDOMEN N/A 2022    Procedure: LAPAROSCOPY, DIAGNOSTIC;  Surgeon: Randa Whalen M.D.;  Location: SURGERY [facility];  Service: Gynecology    ND DRAINAGE OF HEMATOMA/FLUID N/A 2022    Procedure: EVACUATION OF HEMOPERITONEUM AND CAUTERIZATION OF HEMORRHAGIC CYST;  Surgeon: Randa Whalen M.D.;  Location: SURGERY [facility];  Service: Gynecology    LYSIS ADHESIONS GENERAL N/A 2022    Procedure: LYSIS, ADHESIONS;  Surgeon: Randa Whalen M.D.;  Location: SURGERY [facility];  Service: Gynecology    ND  DELIVERY ONLY Bilateral 2019    Procedure:  SECTION, PRIMARY;  Surgeon: Ignacia Gonzalez M.D.;  Location: LABOR AND DELIVERY;  Service: Labor and Delivery       Past Social Hx:   Social History     Tobacco Use    Smoking status: Never     Passive exposure: Yes    Smokeless tobacco: Never    Tobacco comments:     both parents smoked and boyfriend smokes   Vaping Use    Vaping Use: Never used   Substance Use Topics    Alcohol use: Yes     Alcohol/week: 2.4 oz     Types: 4 Shots of liquor per week     Comment: occ    Drug use: No          Problem list, medications, and allergies reviewed by myself today in Epic.     Objective:     /80 (BP Location: Left arm, Patient Position: Sitting, BP Cuff Size: Adult long)   Pulse 60   Temp 36.6 °C (97.8 °F) (Temporal)   Resp 16   Ht 1.651 m (5' 5\")   Wt 104 kg (230 lb)   SpO2 97%   BMI 38.27 kg/m²     Physical Exam  Vitals and nursing note reviewed.   Constitutional:       Appearance: Normal appearance.   HENT:      Head: Normocephalic and atraumatic.   Eyes:      Conjunctiva/sclera: Conjunctivae normal.      Pupils: Pupils are equal, round, and reactive to light.   Cardiovascular:      Rate and Rhythm: Normal rate and regular rhythm.      Heart sounds: Normal heart sounds.   Pulmonary:      Effort: " Pulmonary effort is normal.      Breath sounds: Normal breath sounds.   Abdominal:      General: Bowel sounds are normal.      Palpations: Abdomen is soft.      Tenderness: There is no right CVA tenderness or left CVA tenderness.   Genitourinary:     Comments: Pelvic examination deferred  Neurological:      Mental Status: She is alert and oriented to person, place, and time.         Assessment/Plan:     Diagnosis and associated orders:   1. Acute vaginitis  - VAGINAL PATHOGENS DNA PANEL; Future  - POCT Urinalysis  - metroNIDAZOLE (METROGEL-VAGINAL) 0.75 % Gel; Insert 1 Applicator into the vagina at bedtime for 5 days.  Dispense: 5 Each; Refill: 0  - Chlamydia/GC, PCR (Urine); Future    2. Encounter for assessment of STD exposure  - HIV AG/AB Combo Assay Screening; Future  - T.Pallidum AB RADHA (Screening); Future  - Hepatitis C Virus Antibody; Future  - HEP B Surface Antibody; Future  - Hep B Core AB Total; Future  - Hep B Surface Antigen; Future  - Chlamydia/GC, PCR (Urine); Future    3. UTI symptoms  - URINE CULTURE(NEW); Future        Comments/MDM:     33-year-old female with pmh of PCOS presents to urgent care with concerns of vaginal itching, vaginal discharge. Onset was 5 to 6 days ago.   Will start patient on treatment for BV empirically.  Patient instructed to avoid use of alcohol while on medication and for an additional 48 hours after completion of medication.  UA is positive for blood, leukocytes.  Urine culture ordered  STD panel also ordered  Will follow-up with patient with results once they are available for review  Patient instructed to avoid all forms of sexual activities until her test results are available, she has completed potential treatment, and is asymptomatic.  Follow-up with primary care as advised         Please note that this dictation was created using voice recognition software. I have made a reasonable attempt to correct obvious errors, but I expect that there are errors of grammar and  possibly content that I did not discover before finalizing the note.    This note was electronically signed by Barbra Snowden DNP

## 2023-06-07 LAB
C TRACH DNA GENITAL QL NAA+PROBE: NEGATIVE
N GONORRHOEA DNA GENITAL QL NAA+PROBE: NEGATIVE
SPECIMEN SOURCE: NORMAL

## 2023-06-08 ENCOUNTER — TELEPHONE (OUTPATIENT)
Dept: URGENT CARE | Facility: PHYSICIAN GROUP | Age: 33
End: 2023-06-08
Payer: COMMERCIAL

## 2023-06-08 DIAGNOSIS — R39.9 UTI SYMPTOMS: ICD-10-CM

## 2023-06-08 LAB
BACTERIA UR CULT: ABNORMAL
BACTERIA UR CULT: ABNORMAL
SIGNIFICANT IND 70042: ABNORMAL
SITE SITE: ABNORMAL
SOURCE SOURCE: ABNORMAL

## 2023-06-08 RX ORDER — SULFAMETHOXAZOLE AND TRIMETHOPRIM 800; 160 MG/1; MG/1
1 TABLET ORAL 2 TIMES DAILY
Qty: 10 TABLET | Refills: 0 | Status: SHIPPED | OUTPATIENT
Start: 2023-06-08 | End: 2023-06-13

## 2023-06-08 NOTE — TELEPHONE ENCOUNTER
Left voice mail (#[phone]).   Patient has UTI, Rx Bactrim sent to [facility].  Vaginal pathogen panel and G/C results are negative.

## 2023-07-20 ENCOUNTER — HOSPITAL ENCOUNTER (OUTPATIENT)
Dept: LAB | Facility: MEDICAL CENTER | Age: 33
End: 2023-07-20
Payer: COMMERCIAL

## 2023-07-20 PROCEDURE — 87591 N.GONORRHOEAE DNA AMP PROB: CPT

## 2023-07-20 PROCEDURE — 87491 CHLMYD TRACH DNA AMP PROBE: CPT

## 2023-07-20 PROCEDURE — 88175 CYTOPATH C/V AUTO FLUID REDO: CPT

## 2023-07-22 LAB
HBV CORE AB SERPL QL IA: NEGATIVE
HBV E AG SERPL QL IA: NEGATIVE
HBV SURFACE AB SER QL: REACTIVE
HCV IGG SERPL QL IA: NON REACTIVE
HIV 1+2 AB+HIV1 P24 AG SERPL QL IA: NON REACTIVE
TREPONEMA PALLIDUM IGG+IGM AB [PRESENCE] IN SERUM OR PLASMA BY IMMUNOASSAY: NON REACTIVE

## 2023-08-15 ENCOUNTER — HOSPITAL ENCOUNTER (OUTPATIENT)
Facility: MEDICAL CENTER | Age: 33
End: 2023-08-15
Payer: COMMERCIAL

## 2023-08-15 ENCOUNTER — OFFICE VISIT (OUTPATIENT)
Dept: URGENT CARE | Facility: PHYSICIAN GROUP | Age: 33
End: 2023-08-15
Payer: COMMERCIAL

## 2023-08-15 VITALS
TEMPERATURE: 98.6 F | OXYGEN SATURATION: 98 % | HEIGHT: 65 IN | HEART RATE: 82 BPM | DIASTOLIC BLOOD PRESSURE: 90 MMHG | WEIGHT: 236 LBS | BODY MASS INDEX: 39.32 KG/M2 | RESPIRATION RATE: 16 BRPM | SYSTOLIC BLOOD PRESSURE: 128 MMHG

## 2023-08-15 DIAGNOSIS — Z11.3 ENCOUNTER FOR SCREENING EXAMINATION FOR SEXUALLY TRANSMITTED DISEASE: ICD-10-CM

## 2023-08-15 DIAGNOSIS — N76.0 BACTERIAL VAGINOSIS: ICD-10-CM

## 2023-08-15 DIAGNOSIS — N89.8 VAGINAL IRRITATION: ICD-10-CM

## 2023-08-15 DIAGNOSIS — B96.89 BACTERIAL VAGINOSIS: ICD-10-CM

## 2023-08-15 LAB
APPEARANCE UR: NORMAL
BILIRUB UR STRIP-MCNC: NEGATIVE MG/DL
COLOR UR AUTO: YELLOW
GLUCOSE UR STRIP.AUTO-MCNC: NEGATIVE MG/DL
KETONES UR STRIP.AUTO-MCNC: NEGATIVE MG/DL
LEUKOCYTE ESTERASE UR QL STRIP.AUTO: NEGATIVE
NITRITE UR QL STRIP.AUTO: NEGATIVE
PH UR STRIP.AUTO: 5.5 [PH] (ref 5–8)
POCT INT CON NEG: NEGATIVE
POCT INT CON POS: POSITIVE
POCT URINE PREGNANCY TEST: NEGATIVE
PROT UR QL STRIP: 30 MG/DL
RBC UR QL AUTO: NEGATIVE
SP GR UR STRIP.AUTO: >=1.03
UROBILINOGEN UR STRIP-MCNC: 1 MG/DL

## 2023-08-15 PROCEDURE — 99213 OFFICE O/P EST LOW 20 MIN: CPT

## 2023-08-15 PROCEDURE — 87660 TRICHOMONAS VAGIN DIR PROBE: CPT

## 2023-08-15 PROCEDURE — 81002 URINALYSIS NONAUTO W/O SCOPE: CPT

## 2023-08-15 PROCEDURE — 87591 N.GONORRHOEAE DNA AMP PROB: CPT

## 2023-08-15 PROCEDURE — 3080F DIAST BP >= 90 MM HG: CPT

## 2023-08-15 PROCEDURE — 87491 CHLMYD TRACH DNA AMP PROBE: CPT

## 2023-08-15 PROCEDURE — 87480 CANDIDA DNA DIR PROBE: CPT

## 2023-08-15 PROCEDURE — 87510 GARDNER VAG DNA DIR PROBE: CPT

## 2023-08-15 PROCEDURE — 81025 URINE PREGNANCY TEST: CPT

## 2023-08-15 PROCEDURE — 3074F SYST BP LT 130 MM HG: CPT

## 2023-08-15 RX ORDER — PANTOPRAZOLE SODIUM 20 MG/1
20 TABLET, DELAYED RELEASE ORAL DAILY
COMMUNITY

## 2023-08-15 ASSESSMENT — FIBROSIS 4 INDEX: FIB4 SCORE: 0.69

## 2023-08-16 DIAGNOSIS — N89.8 VAGINAL IRRITATION: ICD-10-CM

## 2023-08-16 DIAGNOSIS — Z11.3 ENCOUNTER FOR SCREENING EXAMINATION FOR SEXUALLY TRANSMITTED DISEASE: ICD-10-CM

## 2023-08-16 LAB
AMBIGUOUS DTTM AMBI4: NORMAL
C TRACH DNA GENITAL QL NAA+PROBE: NEGATIVE
CANDIDA DNA VAG QL PROBE+SIG AMP: NEGATIVE
G VAGINALIS DNA VAG QL PROBE+SIG AMP: POSITIVE
N GONORRHOEA DNA GENITAL QL NAA+PROBE: NEGATIVE
SPECIMEN SOURCE: NORMAL
T VAGINALIS DNA VAG QL PROBE+SIG AMP: NEGATIVE

## 2023-08-16 NOTE — PROGRESS NOTES
"Chief Complaint   Patient presents with    Sexually Transmitted Diseases     Possible exposure.          Subjective:   HISTORY OF PRESENT ILLNESS: Maggy Solis is a 33 y.o. female who presents for concerns about an STD.  She reprots a new partner at a swingers party who did not use a condom last Saturday night.  She has reported mild vaginal irritation.  She states she did an at home BV test and thought it looked positive.  She denies any vaginal discharge or dysuria at this time       Medications, Allergies, current problem list, Social and Family history reviewed today in Epic.     Objective:     BP (!) 128/90 (BP Location: Left arm, Patient Position: Sitting, BP Cuff Size: Adult long)   Pulse 82   Temp 37 °C (98.6 °F) (Temporal)   Resp 16   Ht 1.651 m (5' 5\")   Wt 107 kg (236 lb)   SpO2 98%     Physical Exam  Vitals reviewed.   Constitutional:       Appearance: Normal appearance.   HENT:      Mouth/Throat:      Mouth: Mucous membranes are moist.   Cardiovascular:      Rate and Rhythm: Normal rate.   Pulmonary:      Effort: Pulmonary effort is normal.   Genitourinary:     Comments: deferred  Skin:     General: Skin is warm and dry.   Neurological:      Mental Status: She is alert and oriented to person, place, and time.   Psychiatric:         Mood and Affect: Mood normal.          Assessment/Plan:     Diagnosis and associated orders    I personally reviewed prior external notes and test results pertinent to today's visit.     1. Vaginal irritation  VAGINAL PATHOGENS DNA PANEL    Chlamydia/GC, PCR (Genital/Anal swab)    POCT Urinalysis    POCT Pregnancy      2. Encounter for screening examination for sexually transmitted disease  VAGINAL PATHOGENS DNA PANEL    Chlamydia/GC, PCR (Genital/Anal swab)    POCT Urinalysis    POCT Pregnancy        UA preg neg  LE neg  Nitrite neg        IMPRESSION: Patient is non-toxic appearing and suitable for outpatient care at this time.  She is swabbed for GC/chalmydia " and vaginal pathogen and wiill ollow up when cultures result.  Her UA is unremarkable, she is not pregnant.  Differential diagnosis discussed. She is to abstain for sexual contact until confirmed    Educated on red flag symptoms and Instructed patient to return to Urgent Care or nearest Emergency Department if symptoms fail to improve, for any change in condition, further concerns, or new concerning symptoms. Patient states understanding of the plan of care and discharge instructions.  They are discharged in stable condition.         Please note that this dictation was created using voice recognition software. I have made a reasonable attempt to correct obvious errors, but I expect that there are errors of grammar and possibly content that I did not discover before finalizing the note.    This note was electronically signed by ISMAEL Tian

## 2023-08-17 RX ORDER — METRONIDAZOLE 500 MG/1
500 TABLET ORAL 2 TIMES DAILY
Qty: 14 TABLET | Refills: 0 | Status: SHIPPED | OUTPATIENT
Start: 2023-08-17 | End: 2023-08-24

## 2023-10-31 ENCOUNTER — HOSPITAL ENCOUNTER (OUTPATIENT)
Dept: LAB | Facility: MEDICAL CENTER | Age: 33
End: 2023-10-31
Payer: COMMERCIAL

## 2023-10-31 LAB
CANDIDA DNA VAG QL PROBE+SIG AMP: NEGATIVE
G VAGINALIS DNA VAG QL PROBE+SIG AMP: POSITIVE
T VAGINALIS DNA VAG QL PROBE+SIG AMP: NEGATIVE

## 2023-10-31 PROCEDURE — 87510 GARDNER VAG DNA DIR PROBE: CPT

## 2023-10-31 PROCEDURE — 87480 CANDIDA DNA DIR PROBE: CPT

## 2023-10-31 PROCEDURE — 87591 N.GONORRHOEAE DNA AMP PROB: CPT

## 2023-10-31 PROCEDURE — 87660 TRICHOMONAS VAGIN DIR PROBE: CPT

## 2023-10-31 PROCEDURE — 87491 CHLMYD TRACH DNA AMP PROBE: CPT

## 2023-11-06 ENCOUNTER — HOSPITAL ENCOUNTER (OUTPATIENT)
Dept: RADIOLOGY | Facility: MEDICAL CENTER | Age: 33
End: 2023-11-06
Payer: COMMERCIAL

## 2023-11-06 DIAGNOSIS — T83.32XA INTRAUTERINE CONTRACEPTIVE DEVICE THREADS LOST, INITIAL ENCOUNTER: ICD-10-CM

## 2023-11-06 PROCEDURE — 76830 TRANSVAGINAL US NON-OB: CPT

## 2023-11-16 ENCOUNTER — HOSPITAL ENCOUNTER (OUTPATIENT)
Facility: MEDICAL CENTER | Age: 33
End: 2023-11-16
Attending: NURSE PRACTITIONER
Payer: COMMERCIAL

## 2023-11-16 ENCOUNTER — OFFICE VISIT (OUTPATIENT)
Dept: URGENT CARE | Facility: CLINIC | Age: 33
End: 2023-11-16
Payer: COMMERCIAL

## 2023-11-16 VITALS
HEIGHT: 65 IN | TEMPERATURE: 97.1 F | OXYGEN SATURATION: 98 % | BODY MASS INDEX: 41.52 KG/M2 | DIASTOLIC BLOOD PRESSURE: 70 MMHG | SYSTOLIC BLOOD PRESSURE: 114 MMHG | WEIGHT: 249.2 LBS | RESPIRATION RATE: 14 BRPM | HEART RATE: 78 BPM

## 2023-11-16 DIAGNOSIS — N76.0 ACUTE VAGINITIS: ICD-10-CM

## 2023-11-16 LAB
APPEARANCE UR: NORMAL
BILIRUB UR STRIP-MCNC: NEGATIVE MG/DL
CANDIDA DNA VAG QL PROBE+SIG AMP: POSITIVE
COLOR UR AUTO: NORMAL
G VAGINALIS DNA VAG QL PROBE+SIG AMP: NEGATIVE
GLUCOSE UR STRIP.AUTO-MCNC: NEGATIVE MG/DL
KETONES UR STRIP.AUTO-MCNC: NEGATIVE MG/DL
LEUKOCYTE ESTERASE UR QL STRIP.AUTO: NEGATIVE
NITRITE UR QL STRIP.AUTO: NEGATIVE
PH UR STRIP.AUTO: 7.5 [PH] (ref 5–8)
POCT INT CON NEG: NEGATIVE
POCT INT CON POS: POSITIVE
POCT URINE PREGNANCY TEST: NEGATIVE
PROT UR QL STRIP: NEGATIVE MG/DL
RBC UR QL AUTO: NORMAL
SP GR UR STRIP.AUTO: 1.02
T VAGINALIS DNA VAG QL PROBE+SIG AMP: NEGATIVE
UROBILINOGEN UR STRIP-MCNC: 4 MG/DL

## 2023-11-16 PROCEDURE — 87510 GARDNER VAG DNA DIR PROBE: CPT

## 2023-11-16 PROCEDURE — 87086 URINE CULTURE/COLONY COUNT: CPT

## 2023-11-16 PROCEDURE — 81002 URINALYSIS NONAUTO W/O SCOPE: CPT | Performed by: NURSE PRACTITIONER

## 2023-11-16 PROCEDURE — 87480 CANDIDA DNA DIR PROBE: CPT

## 2023-11-16 PROCEDURE — 87660 TRICHOMONAS VAGIN DIR PROBE: CPT

## 2023-11-16 PROCEDURE — 99213 OFFICE O/P EST LOW 20 MIN: CPT | Performed by: NURSE PRACTITIONER

## 2023-11-16 PROCEDURE — 3074F SYST BP LT 130 MM HG: CPT | Performed by: NURSE PRACTITIONER

## 2023-11-16 PROCEDURE — 3078F DIAST BP <80 MM HG: CPT | Performed by: NURSE PRACTITIONER

## 2023-11-16 PROCEDURE — 81025 URINE PREGNANCY TEST: CPT | Performed by: NURSE PRACTITIONER

## 2023-11-16 RX ORDER — METRONIDAZOLE 7.5 MG/G
1 GEL VAGINAL
Qty: 5 EACH | Refills: 0 | Status: SHIPPED | OUTPATIENT
Start: 2023-11-16 | End: 2023-11-21

## 2023-11-16 RX ORDER — FLUCONAZOLE 150 MG/1
150 TABLET ORAL DAILY
Qty: 2 TABLET | Refills: 0 | Status: SHIPPED | OUTPATIENT
Start: 2023-11-16 | End: 2024-02-24

## 2023-11-16 ASSESSMENT — ENCOUNTER SYMPTOMS
FEVER: 0
SORE THROAT: 0
CHILLS: 0
FLANK PAIN: 0
NAUSEA: 0
VOMITING: 0

## 2023-11-16 ASSESSMENT — FIBROSIS 4 INDEX: FIB4 SCORE: 0.69

## 2023-11-16 NOTE — PROGRESS NOTES
Subjective:   Maggy Solis is a 33 y.o. female who presents for UTI (X 5 days, possible UTI)      UTI  This is a new problem. Episode onset: 5 days; vaginal itching discharge hx of bv treated recently. The problem occurs constantly. The problem has been gradually worsening. Associated symptoms include urinary symptoms. Pertinent negatives include no chills, congestion, fever, nausea, sore throat or vomiting. Nothing aggravates the symptoms. Treatments tried: azo. The treatment provided no relief.   No known exposure STI recently tested and was negative    Review of Systems   Constitutional:  Negative for chills and fever.   HENT:  Negative for congestion and sore throat.    Gastrointestinal:  Negative for nausea and vomiting.   Genitourinary:  Negative for dysuria, flank pain, frequency, hematuria and urgency.        Vaginal itchigna discharge         Medications:    amLODIPine Tabs  asa/apap/caffeine Tabs  ibuprofen Tabs  norethindrone  pantoprazole    Allergies: Patient has no known allergies.    Problem List: Maggy Solis does not have any pertinent problems on file.    Surgical History:  Past Surgical History:   Procedure Laterality Date    MI LAP,DIAGNOSTIC ABDOMEN N/A 2022    Procedure: LAPAROSCOPY, DIAGNOSTIC;  Surgeon: Randa Whalen M.D.;  Location: [facility];  Service: Gynecology    MI DRAINAGE OF HEMATOMA/FLUID N/A 2022    Procedure: EVACUATION OF HEMOPERITONEUM AND CAUTERIZATION OF HEMORRHAGIC CYST;  Surgeon: Randa Whalen M.D.;  Location: [facility];  Service: Gynecology    LYSIS ADHESIONS GENERAL N/A 2022    Procedure: LYSIS, ADHESIONS;  Surgeon: Randa Whalen M.D.;  Location: [facility];  Service: Gynecology    MI  DELIVERY ONLY Bilateral 2019    Procedure:  SECTION, PRIMARY;  Surgeon: Ignacia Gonzalez M.D.;  Location: LABOR AND DELIVERY;  Service: Labor and Delivery       Past Social Hx: Maggy  "Gloria Solis  reports that she has never smoked. She has been exposed to tobacco smoke. She has never used smokeless tobacco. She reports current alcohol use of about 2.4 oz of alcohol per week. She reports that she does not currently use drugs.     Past Family Hx:  Maggy Solis family history includes Diabetes in her mother.     Problem list, medications, and allergies reviewed by myself today in Epic.     Objective:     /70   Pulse 78   Temp 36.2 °C (97.1 °F) (Temporal)   Resp 14   Ht 1.651 m (5' 5\")   Wt 113 kg (249 lb 3.2 oz)   SpO2 98%   BMI 41.47 kg/m²     Physical Exam  Constitutional:       Appearance: Normal appearance. She is not ill-appearing or toxic-appearing.   HENT:      Head: Normocephalic.      Right Ear: External ear normal.      Left Ear: External ear normal.      Nose: Nose normal.      Mouth/Throat:      Lips: Pink.   Eyes:      General: Lids are normal.   Pulmonary:      Effort: Pulmonary effort is normal. No accessory muscle usage.   Musculoskeletal:      Cervical back: Full passive range of motion without pain.   Neurological:      Mental Status: She is alert and oriented to person, place, and time.   Psychiatric:         Mood and Affect: Mood normal.         Thought Content: Thought content normal.         Assessment/Plan:     Diagnosis and associated orders:     1. Acute vaginitis  VAGINAL PATHOGENS DNA PANEL    URINE CULTURE(NEW)    POCT Urinalysis    POCT Pregnancy    metroNIDAZOLE (METROGEL-VAGINAL) 0.75 % Gel    fluconazole (DIFLUCAN) 150 MG tablet           Comments/MDM:     I personally reviewed prior external notes and prior test results pertinent to today's visit.  Patient positive for Gardnerella 10/31 contingent prescription for antifungal and antibiotic prescribed to patient as differentials include Candida, BV  Discussed management options, risks a symptoms reoccurring having no dysuria RBCs noted on UA we will follow-up with urine culture treat only as " indicated.nd benefits, and alternatives to treatment plan agreed upon.   Red flags discussed and indications to immediately call 911 or present to the Emergency Department.   Supportive care, differential diagnoses, and indications for immediate follow-up discussed with patient.    Patient expresses understanding and agrees to plan. Patient denies any other questions or concerns.              Please note that this dictation was created using voice recognition software. I have made a reasonable attempt to correct obvious errors, but I expect that there are errors of grammar and possibly content that I did not discover before finalizing the note.    This note was electronically signed by Marcellus COSTELLO.

## 2023-11-18 LAB
BACTERIA UR CULT: NORMAL
SIGNIFICANT IND 70042: NORMAL
SITE SITE: NORMAL
SOURCE SOURCE: NORMAL

## 2023-11-19 ENCOUNTER — TELEPHONE (OUTPATIENT)
Dept: URGENT CARE | Facility: CLINIC | Age: 33
End: 2023-11-19
Payer: COMMERCIAL

## 2023-11-19 DIAGNOSIS — B37.31 VAGINAL YEAST INFECTION: ICD-10-CM

## 2023-11-19 RX ORDER — FLUCONAZOLE 150 MG/1
150 TABLET ORAL DAILY
Qty: 1 TABLET | Refills: 0 | Status: SHIPPED | OUTPATIENT
Start: 2023-11-19 | End: 2023-11-20

## 2023-12-28 ENCOUNTER — HOSPITAL ENCOUNTER (OUTPATIENT)
Facility: MEDICAL CENTER | Age: 33
End: 2023-12-28
Attending: PHYSICIAN ASSISTANT
Payer: COMMERCIAL

## 2023-12-28 ENCOUNTER — OFFICE VISIT (OUTPATIENT)
Dept: URGENT CARE | Facility: CLINIC | Age: 33
End: 2023-12-28
Payer: COMMERCIAL

## 2023-12-28 VITALS
WEIGHT: 240 LBS | SYSTOLIC BLOOD PRESSURE: 122 MMHG | TEMPERATURE: 97.7 F | HEART RATE: 65 BPM | BODY MASS INDEX: 39.99 KG/M2 | DIASTOLIC BLOOD PRESSURE: 60 MMHG | RESPIRATION RATE: 16 BRPM | HEIGHT: 65 IN | OXYGEN SATURATION: 97 %

## 2023-12-28 DIAGNOSIS — Z11.3 SCREEN FOR STD (SEXUALLY TRANSMITTED DISEASE): ICD-10-CM

## 2023-12-28 PROCEDURE — 87480 CANDIDA DNA DIR PROBE: CPT

## 2023-12-28 PROCEDURE — 3078F DIAST BP <80 MM HG: CPT | Performed by: PHYSICIAN ASSISTANT

## 2023-12-28 PROCEDURE — 87660 TRICHOMONAS VAGIN DIR PROBE: CPT

## 2023-12-28 PROCEDURE — 87591 N.GONORRHOEAE DNA AMP PROB: CPT

## 2023-12-28 PROCEDURE — 99213 OFFICE O/P EST LOW 20 MIN: CPT | Performed by: PHYSICIAN ASSISTANT

## 2023-12-28 PROCEDURE — 87491 CHLMYD TRACH DNA AMP PROBE: CPT

## 2023-12-28 PROCEDURE — 3074F SYST BP LT 130 MM HG: CPT | Performed by: PHYSICIAN ASSISTANT

## 2023-12-28 PROCEDURE — 87510 GARDNER VAG DNA DIR PROBE: CPT

## 2023-12-28 ASSESSMENT — ENCOUNTER SYMPTOMS: ABDOMINAL PAIN: 0

## 2023-12-28 ASSESSMENT — FIBROSIS 4 INDEX: FIB4 SCORE: 0.69

## 2023-12-28 NOTE — PROGRESS NOTES
Subjective:     CHIEF COMPLAINT  Chief Complaint   Patient presents with    Other     Would like to be tested for STDs        HPI  Maggy Solis is a very pleasant 33 y.o. female who presents to the clinic for STD screening.  Currently asymptomatic.  States she has multiple different partners and would like to ensure she is safe.  Wishes to only have GC chlamydia and trichomonas testing performed today.    REVIEW OF SYSTEMS  Review of Systems   Gastrointestinal:  Negative for abdominal pain.   Genitourinary:  Negative for dysuria.   Skin:  Negative for itching and rash.       PAST MEDICAL HISTORY  Patient Active Problem List    Diagnosis Date Noted    Hyperlipidemia 2020    Labor and delivery indication for care or intervention 2019    Meconium aspiration 2019    Nuchal cord affecting delivery 2019    Fetal intolerance to labor, delivered, current hospitalization 2019    Postoperative pain 2019    PCO (polycystic ovaries) 2012    Sickle cell trait (HCC) 2012       SURGICAL HISTORY   has a past surgical history that includes  delivery only (Bilateral, 2019); lap,diagnostic abdomen (N/A, 2022); drainage of hematoma/fluid (N/A, 2022); and lysis adhesions general (N/A, 2022).    ALLERGIES  No Known Allergies    CURRENT MEDICATIONS  Home Medications       Reviewed by Jose E Argueta P.A.-C. (Physician Assistant) on 23 at 1307  Med List Status: <None>     Medication Last Dose Status   amLODIPine (NORVASC) 5 MG Tab Taking Active   asa/apap/caffeine (EXCEDRIN) 250-250-65 MG Tab PRN Active   fluconazole (DIFLUCAN) 150 MG tablet  Active   ibuprofen (MOTRIN) 400 MG Tab  Active   norethindrone (MICRONOR) 0.35 MG tablet  Active   pantoprazole (PROTONIX) 20 MG tablet Taking Active                    SOCIAL HISTORY  Social History     Tobacco Use    Smoking status: Never     Passive exposure: Yes    Smokeless tobacco: Never    Tobacco comments:  "    both parents smoked and boyfriend smokes   Vaping Use    Vaping Use: Never used   Substance and Sexual Activity    Alcohol use: Yes     Alcohol/week: 2.4 oz     Types: 4 Shots of liquor per week    Drug use: Not Currently     Comment: Extasy occasionally    Sexual activity: Yes     Partners: Male       FAMILY HISTORY  Family History   Problem Relation Age of Onset    Diabetes Mother           Objective:     VITAL SIGNS: /60   Pulse 65   Temp 36.5 °C (97.7 °F)   Resp 16   Ht 1.651 m (5' 5\")   Wt 109 kg (240 lb)   SpO2 97%   BMI 39.94 kg/m²     PHYSICAL EXAM  Physical Exam  Constitutional:       Appearance: Normal appearance.   HENT:      Head: Normocephalic and atraumatic.   Eyes:      Conjunctiva/sclera: Conjunctivae normal.   Pulmonary:      Effort: Pulmonary effort is normal.   Neurological:      General: No focal deficit present.      Mental Status: She is alert and oriented to person, place, and time. Mental status is at baseline.         Assessment/Plan:     1. Screen for STD (sexually transmitted disease)  - VAGINAL PATHOGENS DNA PANEL; Future  - Chlamydia/GC, PCR (Genital/Anal swab); Future      MDM/Comments:    STD screening will be sent out.  Results will be available on [employer] in the next 24 to 48 hours.  Please feel free to call with questions or concerns.    Differential diagnosis, natural history, supportive care, and indications for immediate follow-up discussed. All questions answered. Patient agrees with the plan of care.    Follow-up as needed if symptoms worsen or fail to improve to PCP, Urgent care or Emergency Room.    I have personally reviewed prior external notes and test results pertinent to today's visit.  I have independently reviewed and interpreted all diagnostics ordered during this urgent care acute visit.   Discussed management options (risks,benefits, and alternatives to treatment). Pt expresses understanding and the treatment plan was agreed upon. Questions were " encouraged and answered to pt's satisfaction.    Please note that this dictation was created using voice recognition software. I have made a reasonable attempt to correct obvious errors, but I expect that there are errors of grammar and possibly content that I did not discover before finalizing the note.

## 2024-01-05 ENCOUNTER — OFFICE VISIT (OUTPATIENT)
Dept: URGENT CARE | Facility: PHYSICIAN GROUP | Age: 34
End: 2024-01-05
Payer: COMMERCIAL

## 2024-01-05 VITALS
RESPIRATION RATE: 18 BRPM | SYSTOLIC BLOOD PRESSURE: 132 MMHG | DIASTOLIC BLOOD PRESSURE: 82 MMHG | HEIGHT: 65 IN | BODY MASS INDEX: 40.15 KG/M2 | HEART RATE: 63 BPM | WEIGHT: 241 LBS | TEMPERATURE: 97.7 F | OXYGEN SATURATION: 99 %

## 2024-01-05 DIAGNOSIS — J02.9 PHARYNGITIS, UNSPECIFIED ETIOLOGY: ICD-10-CM

## 2024-01-05 LAB — S PYO DNA SPEC NAA+PROBE: NOT DETECTED

## 2024-01-05 PROCEDURE — 87651 STREP A DNA AMP PROBE: CPT | Performed by: STUDENT IN AN ORGANIZED HEALTH CARE EDUCATION/TRAINING PROGRAM

## 2024-01-05 PROCEDURE — 99213 OFFICE O/P EST LOW 20 MIN: CPT | Performed by: STUDENT IN AN ORGANIZED HEALTH CARE EDUCATION/TRAINING PROGRAM

## 2024-01-05 PROCEDURE — 3079F DIAST BP 80-89 MM HG: CPT | Performed by: STUDENT IN AN ORGANIZED HEALTH CARE EDUCATION/TRAINING PROGRAM

## 2024-01-05 PROCEDURE — 3075F SYST BP GE 130 - 139MM HG: CPT | Performed by: STUDENT IN AN ORGANIZED HEALTH CARE EDUCATION/TRAINING PROGRAM

## 2024-01-05 ASSESSMENT — FIBROSIS 4 INDEX: FIB4 SCORE: 0.69

## 2024-01-06 NOTE — PROGRESS NOTES
"Subjective     Maggy Solis is a 33 y.o. female who presents with Pharyngitis (Phlegm (yellow), throat is irritated X 1 day. Tested NEG for COVID at home X this morning.)            Pharyngitis   This is a new problem. The current episode started yesterday. The problem has been unchanged. Associated symptoms include congestion and trouble swallowing. Pertinent negatives include no abdominal pain, coughing, diarrhea, ear pain, shortness of breath, swollen glands or vomiting.       Review of Systems   Constitutional:  Positive for malaise/fatigue. Negative for chills and fever.   HENT:  Positive for congestion, sore throat and trouble swallowing. Negative for ear pain.    Respiratory:  Negative for cough, shortness of breath and wheezing.    Cardiovascular:  Negative for chest pain and palpitations.   Gastrointestinal:  Negative for abdominal pain, constipation, diarrhea, nausea and vomiting.   All other systems reviewed and are negative.             Objective     /82 (BP Location: Left arm, Patient Position: Sitting, BP Cuff Size: Adult)   Pulse 63   Temp 36.5 °C (97.7 °F) (Temporal)   Resp 18   Ht 1.651 m (5' 5\")   Wt 109 kg (241 lb)   SpO2 99%   BMI 40.10 kg/m²      Physical Exam  Vitals reviewed.   Constitutional:       General: She is not in acute distress.     Appearance: Normal appearance. She is not toxic-appearing.   HENT:      Head: Normocephalic and atraumatic.      Right Ear: Tympanic membrane, ear canal and external ear normal.      Left Ear: Tympanic membrane, ear canal and external ear normal.      Nose: Nose normal.      Mouth/Throat:      Mouth: Mucous membranes are moist.      Pharynx: Oropharynx is clear. Posterior oropharyngeal erythema present. No oropharyngeal exudate.   Eyes:      Extraocular Movements: Extraocular movements intact.      Conjunctiva/sclera: Conjunctivae normal.      Pupils: Pupils are equal, round, and reactive to light.   Cardiovascular:      Rate and Rhythm: " Normal rate and regular rhythm.   Pulmonary:      Effort: Pulmonary effort is normal.      Breath sounds: Normal breath sounds.   Skin:     General: Skin is warm and dry.   Neurological:      General: No focal deficit present.      Mental Status: She is alert.                             Assessment & Plan        1. Pharyngitis, unspecified etiology  - POCT CEPHEID GROUP A STREP - PCR: NEGATIVE    Differential diagnoses, supportive care measures (patient, OTC Tylenol/ibuprofen, throat lozenges, humidified air) and indications for immediate follow-up discussed with patient. Pathogenesis of diagnosis discussed including typical length and natural progression.      Instructed to return to urgent care or nearest emergency department if symptoms fail to improve, for any change in condition, further concerns, or new concerning symptoms.    Patient states understanding and agrees with the plan of care and discharge instructions.

## 2024-01-12 ASSESSMENT — ENCOUNTER SYMPTOMS
FEVER: 0
TROUBLE SWALLOWING: 1
VOMITING: 0
COUGH: 0
NAUSEA: 0
SWOLLEN GLANDS: 0
PALPITATIONS: 0
SORE THROAT: 1
CHILLS: 0
WHEEZING: 0
ABDOMINAL PAIN: 0
CONSTIPATION: 0
DIARRHEA: 0
SHORTNESS OF BREATH: 0

## 2024-02-24 ENCOUNTER — HOSPITAL ENCOUNTER (OUTPATIENT)
Facility: MEDICAL CENTER | Age: 34
End: 2024-02-24
Attending: FAMILY MEDICINE
Payer: COMMERCIAL

## 2024-02-24 ENCOUNTER — OFFICE VISIT (OUTPATIENT)
Dept: URGENT CARE | Facility: PHYSICIAN GROUP | Age: 34
End: 2024-02-24
Payer: COMMERCIAL

## 2024-02-24 VITALS
DIASTOLIC BLOOD PRESSURE: 90 MMHG | RESPIRATION RATE: 18 BRPM | TEMPERATURE: 97.9 F | BODY MASS INDEX: 41.51 KG/M2 | HEIGHT: 65 IN | HEART RATE: 72 BPM | OXYGEN SATURATION: 98 % | SYSTOLIC BLOOD PRESSURE: 140 MMHG | WEIGHT: 249.12 LBS

## 2024-02-24 DIAGNOSIS — Z71.1 CONCERN ABOUT STD IN FEMALE WITHOUT DIAGNOSIS: ICD-10-CM

## 2024-02-24 LAB
APPEARANCE UR: NORMAL
BILIRUB UR STRIP-MCNC: NEGATIVE MG/DL
CANDIDA DNA VAG QL PROBE+SIG AMP: NEGATIVE
COLOR UR AUTO: NORMAL
G VAGINALIS DNA VAG QL PROBE+SIG AMP: POSITIVE
GLUCOSE UR STRIP.AUTO-MCNC: NEGATIVE MG/DL
KETONES UR STRIP.AUTO-MCNC: NEGATIVE MG/DL
LEUKOCYTE ESTERASE UR QL STRIP.AUTO: NEGATIVE
NITRITE UR QL STRIP.AUTO: NEGATIVE
PH UR STRIP.AUTO: 7.5 [PH] (ref 5–8)
PROT UR QL STRIP: NEGATIVE MG/DL
RBC UR QL AUTO: NEGATIVE
SP GR UR STRIP.AUTO: 1.02
T VAGINALIS DNA VAG QL PROBE+SIG AMP: NEGATIVE
UROBILINOGEN UR STRIP-MCNC: 1 MG/DL

## 2024-02-24 PROCEDURE — 81002 URINALYSIS NONAUTO W/O SCOPE: CPT | Performed by: FAMILY MEDICINE

## 2024-02-24 PROCEDURE — 87491 CHLMYD TRACH DNA AMP PROBE: CPT

## 2024-02-24 PROCEDURE — 87510 GARDNER VAG DNA DIR PROBE: CPT

## 2024-02-24 PROCEDURE — 87591 N.GONORRHOEAE DNA AMP PROB: CPT

## 2024-02-24 PROCEDURE — 87480 CANDIDA DNA DIR PROBE: CPT

## 2024-02-24 PROCEDURE — 3077F SYST BP >= 140 MM HG: CPT | Performed by: FAMILY MEDICINE

## 2024-02-24 PROCEDURE — 3080F DIAST BP >= 90 MM HG: CPT | Performed by: FAMILY MEDICINE

## 2024-02-24 PROCEDURE — 87660 TRICHOMONAS VAGIN DIR PROBE: CPT

## 2024-02-24 PROCEDURE — 99213 OFFICE O/P EST LOW 20 MIN: CPT | Performed by: FAMILY MEDICINE

## 2024-02-24 ASSESSMENT — FIBROSIS 4 INDEX: FIB4 SCORE: 0.71

## 2024-02-24 ASSESSMENT — ENCOUNTER SYMPTOMS
BACK PAIN: 1
FEVER: 0

## 2024-02-25 ENCOUNTER — TELEPHONE (OUTPATIENT)
Dept: URGENT CARE | Facility: PHYSICIAN GROUP | Age: 34
End: 2024-02-25
Payer: COMMERCIAL

## 2024-02-25 DIAGNOSIS — B96.89 BACTERIAL VAGINITIS: ICD-10-CM

## 2024-02-25 DIAGNOSIS — N76.0 BACTERIAL VAGINITIS: ICD-10-CM

## 2024-02-25 RX ORDER — METRONIDAZOLE 7.5 MG/G
1 GEL VAGINAL
Qty: 5 EACH | Refills: 0 | Status: SHIPPED | OUTPATIENT
Start: 2024-02-25 | End: 2024-03-01

## 2024-09-21 ENCOUNTER — HOSPITAL ENCOUNTER (OUTPATIENT)
Dept: LAB | Facility: MEDICAL CENTER | Age: 34
End: 2024-09-21
Attending: NURSE PRACTITIONER
Payer: MEDICAID

## 2024-09-21 LAB
BASOPHILS # BLD AUTO: 0.7 % (ref 0–1.8)
BASOPHILS # BLD: 0.07 K/UL (ref 0–0.12)
C TRACH DNA SPEC QL NAA+PROBE: NEGATIVE
EOSINOPHIL # BLD AUTO: 0.14 K/UL (ref 0–0.51)
EOSINOPHIL NFR BLD: 1.4 % (ref 0–6.9)
ERYTHROCYTE [DISTWIDTH] IN BLOOD BY AUTOMATED COUNT: 38.3 FL (ref 35.9–50)
EST. AVERAGE GLUCOSE BLD GHB EST-MCNC: 91 MG/DL
HBA1C MFR BLD: 4.8 % (ref 4–5.6)
HCT VFR BLD AUTO: 36.4 % (ref 37–47)
HGB BLD-MCNC: 12.1 G/DL (ref 12–16)
IMM GRANULOCYTES # BLD AUTO: 0.03 K/UL (ref 0–0.11)
IMM GRANULOCYTES NFR BLD AUTO: 0.3 % (ref 0–0.9)
LYMPHOCYTES # BLD AUTO: 2.31 K/UL (ref 1–4.8)
LYMPHOCYTES NFR BLD: 22.7 % (ref 22–41)
MCH RBC QN AUTO: 23.6 PG (ref 27–33)
MCHC RBC AUTO-ENTMCNC: 33.2 G/DL (ref 32.2–35.5)
MCV RBC AUTO: 71.1 FL (ref 81.4–97.8)
MONOCYTES # BLD AUTO: 0.62 K/UL (ref 0–0.85)
MONOCYTES NFR BLD AUTO: 6.1 % (ref 0–13.4)
N GONORRHOEA DNA SPEC QL NAA+PROBE: NEGATIVE
NEUTROPHILS # BLD AUTO: 6.99 K/UL (ref 1.82–7.42)
NEUTROPHILS NFR BLD: 68.8 % (ref 44–72)
NRBC # BLD AUTO: 0 K/UL
NRBC BLD-RTO: 0 /100 WBC (ref 0–0.2)
PLATELET # BLD AUTO: 390 K/UL (ref 164–446)
PMV BLD AUTO: 10.5 FL (ref 9–12.9)
RBC # BLD AUTO: 5.12 M/UL (ref 4.2–5.4)
SPECIMEN SOURCE: NORMAL
WBC # BLD AUTO: 10.2 K/UL (ref 4.8–10.8)

## 2024-09-21 PROCEDURE — 82306 VITAMIN D 25 HYDROXY: CPT

## 2024-09-21 PROCEDURE — 87591 N.GONORRHOEAE DNA AMP PROB: CPT

## 2024-09-21 PROCEDURE — 87389 HIV-1 AG W/HIV-1&-2 AB AG IA: CPT

## 2024-09-21 PROCEDURE — 85025 COMPLETE CBC W/AUTO DIFF WBC: CPT

## 2024-09-21 PROCEDURE — 36415 COLL VENOUS BLD VENIPUNCTURE: CPT

## 2024-09-21 PROCEDURE — 80061 LIPID PANEL: CPT

## 2024-09-21 PROCEDURE — 80053 COMPREHEN METABOLIC PANEL: CPT

## 2024-09-21 PROCEDURE — 86696 HERPES SIMPLEX TYPE 2 TEST: CPT

## 2024-09-21 PROCEDURE — 84443 ASSAY THYROID STIM HORMONE: CPT

## 2024-09-21 PROCEDURE — 87491 CHLMYD TRACH DNA AMP PROBE: CPT

## 2024-09-21 PROCEDURE — 86780 TREPONEMA PALLIDUM: CPT

## 2024-09-21 PROCEDURE — 83036 HEMOGLOBIN GLYCOSYLATED A1C: CPT

## 2024-09-21 PROCEDURE — 86803 HEPATITIS C AB TEST: CPT

## 2024-09-22 LAB
25(OH)D3 SERPL-MCNC: 28 NG/ML (ref 30–100)
ALBUMIN SERPL BCP-MCNC: 3.8 G/DL (ref 3.2–4.9)
ALBUMIN/GLOB SERPL: 1.2 G/DL
ALP SERPL-CCNC: 61 U/L (ref 30–99)
ALT SERPL-CCNC: 15 U/L (ref 2–50)
ANION GAP SERPL CALC-SCNC: 15 MMOL/L (ref 7–16)
AST SERPL-CCNC: 20 U/L (ref 12–45)
BILIRUB SERPL-MCNC: 0.7 MG/DL (ref 0.1–1.5)
BUN SERPL-MCNC: 11 MG/DL (ref 8–22)
CALCIUM ALBUM COR SERPL-MCNC: 8.8 MG/DL (ref 8.5–10.5)
CALCIUM SERPL-MCNC: 8.6 MG/DL (ref 8.5–10.5)
CHLORIDE SERPL-SCNC: 102 MMOL/L (ref 96–112)
CHOLEST SERPL-MCNC: 187 MG/DL (ref 100–199)
CO2 SERPL-SCNC: 19 MMOL/L (ref 20–33)
CREAT SERPL-MCNC: 0.98 MG/DL (ref 0.5–1.4)
GFR SERPLBLD CREATININE-BSD FMLA CKD-EPI: 78 ML/MIN/1.73 M 2
GLOBULIN SER CALC-MCNC: 3.2 G/DL (ref 1.9–3.5)
GLUCOSE SERPL-MCNC: 79 MG/DL (ref 65–99)
HCV AB SER QL: NORMAL
HDLC SERPL-MCNC: 65 MG/DL
HIV 1+2 AB+HIV1 P24 AG SERPL QL IA: NORMAL
LDLC SERPL CALC-MCNC: 109 MG/DL
POTASSIUM SERPL-SCNC: 4.1 MMOL/L (ref 3.6–5.5)
PROT SERPL-MCNC: 7 G/DL (ref 6–8.2)
SODIUM SERPL-SCNC: 136 MMOL/L (ref 135–145)
T PALLIDUM AB SER QL IA: NORMAL
TRIGL SERPL-MCNC: 64 MG/DL (ref 0–149)
TSH SERPL-ACNC: 2.03 UIU/ML (ref 0.35–5.5)

## 2024-09-23 LAB — HSV2 GG IGG SER-ACNC: 0.09 IV

## 2025-01-07 ENCOUNTER — OFFICE VISIT (OUTPATIENT)
Dept: URGENT CARE | Facility: PHYSICIAN GROUP | Age: 35
End: 2025-01-07
Payer: MEDICAID

## 2025-01-07 ENCOUNTER — HOSPITAL ENCOUNTER (OUTPATIENT)
Facility: MEDICAL CENTER | Age: 35
End: 2025-01-07
Payer: MEDICAID

## 2025-01-07 VITALS
WEIGHT: 240.3 LBS | HEART RATE: 67 BPM | RESPIRATION RATE: 18 BRPM | BODY MASS INDEX: 40.04 KG/M2 | SYSTOLIC BLOOD PRESSURE: 122 MMHG | HEIGHT: 65 IN | TEMPERATURE: 98.1 F | OXYGEN SATURATION: 97 % | DIASTOLIC BLOOD PRESSURE: 90 MMHG

## 2025-01-07 DIAGNOSIS — N76.0 ACUTE VAGINITIS: ICD-10-CM

## 2025-01-07 DIAGNOSIS — Z72.51 HIGH RISK SEXUAL BEHAVIOR, UNSPECIFIED TYPE: ICD-10-CM

## 2025-01-07 LAB
APPEARANCE UR: NORMAL
BILIRUB UR STRIP-MCNC: NORMAL MG/DL
COLOR UR AUTO: NORMAL
GLUCOSE UR STRIP.AUTO-MCNC: NEGATIVE MG/DL
KETONES UR STRIP.AUTO-MCNC: NORMAL MG/DL
LEUKOCYTE ESTERASE UR QL STRIP.AUTO: NEGATIVE
NITRITE UR QL STRIP.AUTO: NEGATIVE
PH UR STRIP.AUTO: 6 [PH] (ref 5–8)
POCT INT CON NEG: NEGATIVE
POCT INT CON POS: POSITIVE
POCT URINE PREGNANCY TEST: NEGATIVE
PROT UR QL STRIP: 30 MG/DL
RBC UR QL AUTO: NORMAL
SP GR UR STRIP.AUTO: >=1.03
UROBILINOGEN UR STRIP-MCNC: 4 MG/DL

## 2025-01-07 PROCEDURE — 87086 URINE CULTURE/COLONY COUNT: CPT

## 2025-01-07 PROCEDURE — 87491 CHLMYD TRACH DNA AMP PROBE: CPT

## 2025-01-07 PROCEDURE — 87660 TRICHOMONAS VAGIN DIR PROBE: CPT

## 2025-01-07 PROCEDURE — 81002 URINALYSIS NONAUTO W/O SCOPE: CPT

## 2025-01-07 PROCEDURE — 87510 GARDNER VAG DNA DIR PROBE: CPT

## 2025-01-07 PROCEDURE — 87480 CANDIDA DNA DIR PROBE: CPT

## 2025-01-07 PROCEDURE — 81025 URINE PREGNANCY TEST: CPT

## 2025-01-07 PROCEDURE — 87591 N.GONORRHOEAE DNA AMP PROB: CPT

## 2025-01-07 PROCEDURE — 3080F DIAST BP >= 90 MM HG: CPT

## 2025-01-07 PROCEDURE — 3074F SYST BP LT 130 MM HG: CPT

## 2025-01-07 PROCEDURE — 99214 OFFICE O/P EST MOD 30 MIN: CPT

## 2025-01-07 RX ORDER — PROPRANOLOL HYDROCHLORIDE 10 MG/1
10 TABLET ORAL PRN
COMMUNITY

## 2025-01-07 RX ORDER — VENLAFAXINE HYDROCHLORIDE 75 MG/1
75 CAPSULE, EXTENDED RELEASE ORAL
COMMUNITY
Start: 2024-12-22

## 2025-01-07 RX ORDER — NICOTINE POLACRILEX 2 MG
GUM BUCCAL
COMMUNITY

## 2025-01-07 ASSESSMENT — FIBROSIS 4 INDEX: FIB4 SCORE: 0.45

## 2025-01-08 ENCOUNTER — TELEPHONE (OUTPATIENT)
Dept: URGENT CARE | Facility: CLINIC | Age: 35
End: 2025-01-08
Payer: MEDICAID

## 2025-01-08 DIAGNOSIS — B96.89 BV (BACTERIAL VAGINOSIS): ICD-10-CM

## 2025-01-08 DIAGNOSIS — N76.0 BV (BACTERIAL VAGINOSIS): ICD-10-CM

## 2025-01-08 RX ORDER — METRONIDAZOLE 500 MG/1
500 TABLET ORAL 2 TIMES DAILY
Qty: 14 TABLET | Refills: 0 | Status: SHIPPED | OUTPATIENT
Start: 2025-01-08 | End: 2025-01-15

## 2025-01-08 NOTE — TELEPHONE ENCOUNTER
Patient called and informed of  positive bacterial vaginosis results via voicemail.  Prescription for metronidazole has been sent to pharmacy.

## 2025-01-08 NOTE — PROGRESS NOTES
Subjective:     CHIEF COMPLAINT  Chief Complaint   Patient presents with    Other     C/O vaginal irritation and itchiness. She did do a boric acid suppository last night and a Monistat one this morning. She is no longer having the itching, but is stating she is not comfortable. Also asking for full STD work up.        JAVY Solis is a very pleasant 35 y.o. female who presents with vaginal itching and irritation that started yesterday.  She is concerned for possible yeast infection or bacterial vaginosis.  She reports a history of recurrent BV.  She tried performing a boric acid suppository without improvement in symptoms.  Additionally she started treatment with Monistat today.  She is not experiencing any UTI symptoms.  Additionally, the patient would like to have gonorrhea and Chlamydia testing performed today.  She recently found out that her partner has a new sexual partner.  She denies any unusual changes in discharge.      REVIEW OF SYSTEMS  Review of Systems   Genitourinary:  Negative for dysuria, frequency and urgency.       PAST MEDICAL HISTORY  Patient Active Problem List    Diagnosis Date Noted    Hyperlipidemia 2020    Labor and delivery indication for care or intervention 2019    Meconium aspiration 2019    Nuchal cord affecting delivery 2019    Fetal intolerance to labor, delivered, current hospitalization 2019    Postoperative pain 2019    PCO (polycystic ovaries) 2012    Sickle cell trait (HCC) 2012       SURGICAL HISTORY   has a past surgical history that includes  delivery only (Bilateral, 2019); lap,diagnostic abdomen (N/A, 2022); drainage of hematoma/fluid (N/A, 2022); and lysis adhesions general (N/A, 2022).    ALLERGIES  No Known Allergies    CURRENT MEDICATIONS  Home Medications       Reviewed by Marguerite Friedman P.A.-C. (Physician Assistant) on 25 at 1626  Med List Status: <None>     Medication  "Last Dose Status   amLODIPine (NORVASC) 5 MG Tab Taking Active   asa/apap/caffeine (EXCEDRIN) 250-250-65 MG Tab Taking Active   Biotin 1 MG Cap Taking Active   ibuprofen (MOTRIN) 400 MG Tab Taking Active   pantoprazole (PROTONIX) 20 MG tablet Taking Active   propranolol (INDERAL) 10 MG Tab Taking Active   venlafaxine XR (EFFEXOR XR) 75 MG CAPSULE SR 24 HR Taking Active                    SOCIAL HISTORY  Social History     Tobacco Use    Smoking status: Never     Passive exposure: Yes    Smokeless tobacco: Never    Tobacco comments:     both parents smoked and boyfriend smokes   Vaping Use    Vaping status: Never Used   Substance and Sexual Activity    Alcohol use: Yes     Alcohol/week: 2.4 oz     Types: 4 Shots of liquor per week     Comment: Few times a week    Drug use: Yes     Types: Marijuana     Comment: Extasy occasionally    Sexual activity: Yes     Partners: Male       FAMILY HISTORY  Family History   Problem Relation Age of Onset    Diabetes Mother           Objective:     VITAL SIGNS: BP (!) 122/90 (BP Location: Left arm, Patient Position: Sitting, BP Cuff Size: Adult long)   Pulse 67   Temp 36.7 °C (98.1 °F) (Temporal)   Resp 18   Ht 1.651 m (5' 5\")   Wt 109 kg (240 lb 4.8 oz)   SpO2 97%   BMI 39.99 kg/m²     PHYSICAL EXAM  Physical Exam  Vitals reviewed.   Constitutional:       General: She is not in acute distress.     Appearance: Normal appearance. She is not ill-appearing or toxic-appearing.   HENT:      Head: Normocephalic and atraumatic.      Mouth/Throat:      Mouth: Mucous membranes are moist.   Eyes:      Conjunctiva/sclera: Conjunctivae normal.      Pupils: Pupils are equal, round, and reactive to light.   Cardiovascular:      Rate and Rhythm: Normal rate.   Pulmonary:      Effort: Pulmonary effort is normal. No respiratory distress.   Skin:     General: Skin is warm and dry.   Neurological:      General: No focal deficit present.      Mental Status: She is alert and oriented to person, " place, and time.   Psychiatric:         Mood and Affect: Mood normal.         Assessment/Plan:     1. Acute vaginitis  - POCT Urinalysis  - POCT Pregnancy  - VAGINAL PATHOGENS DNA PANEL; Future  - URINE CULTURE(NEW); Future    2. High risk sexual behavior, unspecified type  - Chlamydia/GC, PCR (Genital/Anal swab); Future    Other orders  - Biotin 1 MG Cap; Take  by mouth.  - venlafaxine XR (EFFEXOR XR) 75 MG CAPSULE SR 24 HR; Take 75 mg by mouth every day.  - propranolol (INDERAL) 10 MG Tab; Take 10 mg by mouth as needed.  -Return to clinic if symptoms worsen or fail to resolve  -Tylenol OTC as needed for any discomfort    MDM/Comments:  Patient has stable vital signs and is non-toxic appearing.  Urinalysis obtained in office positive for trace blood.  Patient reports she is currently spotting.  Additionally positive for ketones and protein.  I suspect the urinalysis results may be altered by recent boric acid suppository as well as current use of Monistat.  Urine has been sent for culture with results pending.  A vaginal pathogen swab has been obtained with results pending.   Gonorrhea and Chlamydia testing has been obtained in office with results pending.  Patient demonstrated understanding of treatment plan at this time and will RTC if symptoms worsen or fail to resolve.     Differential diagnosis, natural history, supportive care, and indications for immediate follow-up discussed. All questions answered. Patient agrees with the plan of care.    Follow-up as needed if symptoms worsen or fail to improve to PCP, Urgent care or Emergency Room.    I have personally reviewed prior external notes and test results pertinent to today's visit.  I have independently reviewed and interpreted all diagnostics ordered during this urgent care acute visit.   Discussed management options (risks,benefits, and alternatives to treatment). Pt expresses understanding and the treatment plan was agreed upon. Questions were encouraged  and answered to pt's satisfaction.    Please note that this dictation was created using voice recognition software. I have made a reasonable attempt to correct obvious errors, but I expect that there are errors of grammar and possibly content that I did not discover before finalizing the note.

## 2025-01-10 LAB
BACTERIA UR CULT: NORMAL
SIGNIFICANT IND 70042: NORMAL
SITE SITE: NORMAL
SOURCE SOURCE: NORMAL

## 2025-03-13 NOTE — ED NOTES
Rounded on pt. POC reviewed. Pt resting comfortably. Pt medicated per ERP orders.    advance diet with DM modification as medically feasible advance diet with DM modification as medically feasible-->Diet just advanced per MD

## 2025-04-07 ENCOUNTER — RESULTS FOLLOW-UP (OUTPATIENT)
Dept: URGENT CARE | Facility: PHYSICIAN GROUP | Age: 35
End: 2025-04-07

## 2025-04-07 ENCOUNTER — HOSPITAL ENCOUNTER (OUTPATIENT)
Facility: MEDICAL CENTER | Age: 35
End: 2025-04-07
Payer: MEDICAID

## 2025-04-07 ENCOUNTER — OFFICE VISIT (OUTPATIENT)
Dept: URGENT CARE | Facility: PHYSICIAN GROUP | Age: 35
End: 2025-04-07
Payer: MEDICAID

## 2025-04-07 VITALS
SYSTOLIC BLOOD PRESSURE: 132 MMHG | HEART RATE: 72 BPM | TEMPERATURE: 98.1 F | OXYGEN SATURATION: 99 % | DIASTOLIC BLOOD PRESSURE: 76 MMHG | HEIGHT: 65 IN | WEIGHT: 227.51 LBS | RESPIRATION RATE: 20 BRPM | BODY MASS INDEX: 37.91 KG/M2

## 2025-04-07 DIAGNOSIS — N76.0 BV (BACTERIAL VAGINOSIS): ICD-10-CM

## 2025-04-07 DIAGNOSIS — Z11.3 ENCOUNTER FOR SCREENING EXAMINATION FOR SEXUALLY TRANSMITTED DISEASE: ICD-10-CM

## 2025-04-07 DIAGNOSIS — B96.89 BV (BACTERIAL VAGINOSIS): ICD-10-CM

## 2025-04-07 LAB
CANDIDA DNA VAG QL PROBE+SIG AMP: NEGATIVE
G VAGINALIS DNA VAG QL PROBE+SIG AMP: POSITIVE
POCT INT CON NEG: NEGATIVE
POCT INT CON POS: POSITIVE
POCT URINE PREGNANCY TEST: NEGATIVE
T VAGINALIS DNA VAG QL PROBE+SIG AMP: NEGATIVE

## 2025-04-07 PROCEDURE — 87660 TRICHOMONAS VAGIN DIR PROBE: CPT

## 2025-04-07 PROCEDURE — 87491 CHLMYD TRACH DNA AMP PROBE: CPT

## 2025-04-07 PROCEDURE — 87591 N.GONORRHOEAE DNA AMP PROB: CPT

## 2025-04-07 PROCEDURE — 3075F SYST BP GE 130 - 139MM HG: CPT

## 2025-04-07 PROCEDURE — 3078F DIAST BP <80 MM HG: CPT

## 2025-04-07 PROCEDURE — 81025 URINE PREGNANCY TEST: CPT

## 2025-04-07 PROCEDURE — 87480 CANDIDA DNA DIR PROBE: CPT

## 2025-04-07 PROCEDURE — 99213 OFFICE O/P EST LOW 20 MIN: CPT

## 2025-04-07 PROCEDURE — 87510 GARDNER VAG DNA DIR PROBE: CPT

## 2025-04-07 RX ORDER — METRONIDAZOLE 500 MG/1
500 TABLET ORAL 2 TIMES DAILY
Qty: 14 TABLET | Refills: 0 | Status: SHIPPED | OUTPATIENT
Start: 2025-04-07 | End: 2025-04-14

## 2025-04-07 RX ORDER — METRONIDAZOLE 7.5 MG/G
1 GEL VAGINAL
Qty: 5 EACH | Refills: 0 | Status: SHIPPED | OUTPATIENT
Start: 2025-04-07 | End: 2025-04-12

## 2025-04-07 ASSESSMENT — ENCOUNTER SYMPTOMS
SHORTNESS OF BREATH: 0
GASTROINTESTINAL NEGATIVE: 1
FEVER: 0
EYES NEGATIVE: 1
COUGH: 0
CHILLS: 0
MUSCULOSKELETAL NEGATIVE: 1
NEUROLOGICAL NEGATIVE: 1

## 2025-04-07 ASSESSMENT — FIBROSIS 4 INDEX: FIB4 SCORE: 0.45

## 2025-04-07 NOTE — PROGRESS NOTES
Subjective:     Chief Complaint   Patient presents with    Sexually Transmitted Diseases     Patient is wanting to get checked for any STI's, patient states is having no symptoms and no exposure. Patient is also requesting we do a pregnancy test.       HPI:  Maggy Solis is a 35 y.o. female who presents for Routine STD testing. Denies any discharge, painful urination, or lesions. No fever, body aches, rashes, chills, nausea, vomiting, or diarrhea.  Last STI testing was in January.      ROS:  Review of Systems   Constitutional:  Negative for chills and fever.   HENT: Negative.     Eyes: Negative.    Respiratory:  Negative for cough and shortness of breath.    Cardiovascular:  Negative for chest pain.   Gastrointestinal: Negative.    Genitourinary: Negative.    Musculoskeletal: Negative.    Skin:  Negative for rash.   Neurological: Negative.    All other systems reviewed and are negative.       CURRENT MEDICATIONS:  Current Outpatient Medications   Medication Sig Refill Last Dispense    amLODIPine (NORVASC) 5 MG Tab Take 1 Tablet by mouth every day. 0 Unknown (outside pharmacy)    asa/apap/caffeine (EXCEDRIN) 250-250-65 MG Tab Take 1 Tablet by mouth every 6 hours as needed for Headache.  Unknown (patient-reported)    Biotin 1 MG Cap Take  by mouth.  Unknown (patient-reported)    ibuprofen (MOTRIN) 400 MG Tab Take 400 mg by mouth every 6 hours as needed for Headache.  Unknown (patient-reported)    metroNIDAZOLE (FLAGYL) 500 MG Tab Take 1 Tablet by mouth 2 times a day for 7 days. 0 Unknown (outside pharmacy)    pantoprazole (PROTONIX) 20 MG tablet Take 20 mg by mouth every day.  Unknown (patient-reported)    propranolol (INDERAL) 10 MG Tab Take 10 mg by mouth as needed.  Unknown (patient-reported)    venlafaxine XR (EFFEXOR XR) 75 MG CAPSULE SR 24 HR Take 75 mg by mouth every day.  Unknown (patient-reported)       ALLERGIES:   No Known Allergies    PROBLEM LIST:    does not have any pertinent problems on  "file.    Allergies, Medications, & Tobacco/Substance Use were reconciled by the Medical Assistant and reviewed by myself.     Objective:   /76   Pulse 72   Temp 36.7 °C (98.1 °F) (Temporal)   Resp 20   Ht 1.651 m (5' 5\")   Wt 103 kg (227 lb 8.2 oz)   SpO2 99%   BMI 37.86 kg/m²     Physical Exam  Constitutional:       General: She is not in acute distress.     Appearance: She is not ill-appearing or toxic-appearing.   Cardiovascular:      Rate and Rhythm: Normal rate and regular rhythm.   Pulmonary:      Effort: Pulmonary effort is normal.      Breath sounds: Normal breath sounds.   Skin:     General: Skin is warm and dry.   Neurological:      Mental Status: She is alert.       Assessment/Plan:   Pt's history and physical exam consistent with asymptomatic STD screen.   Assessment & Plan  Encounter for screening examination for sexually transmitted disease  Orders:    Chlamydia/GC, PCR (Genital/Anal swab); Future    VAGINAL PATHOGENS DNA PANEL; Future    POCT PREGNANCY  - Safe sex practices: to avoid reinfection, abstain from sexual intercourse until you and your sex partners have both been adequately treated and symptoms have resolved  - Notify partners of positive results. Including any sexual contacts within the 60 days prior to infection.  - Monitor for new changes or lesions.   - Follow up for new or persistent symptoms, or any other concerns including  persistent abdominal pain, flank pain, difficulty with urination, fevers, vomiting, weakness, tachycardia, or testicular pain or swelling    BV (bacterial vaginosis)  Orders:    metroNIDAZOLE (FLAGYL) 500 MG Tab; Take 1 Tablet by mouth 2 times a day for 7 days.       Discussed differential diagnosis, management options, risks/benefits, and alternatives to planned treatment. Pt expressed understanding and the treatment plan was agreed upon. Questions were encouraged and answered. Pt encouraged to return to urgent care as needed if new or worsening " symptoms or if there is no improvement in condition. Pt educated in red flags and indications to immediately call 911 or present to the Emergency Department. Advised the patient to follow-up with the primary care physician for recheck, reevaluation, and further management.    I personally reviewed prior external notes and test results pertinent to today's visit. I have independently reviewed and interpreted all diagnostics ordered during this visit.    Please note that this dictation was created using voice recognition software. I have made a reasonable attempt to correct obvious errors, but I expect that there are errors of grammar and possibly content that I did not discover before finalizing the note.    This note was electronically signed by PRAVIN Mason

## 2025-04-08 LAB
C TRACH DNA GENITAL QL NAA+PROBE: NEGATIVE
N GONORRHOEA DNA GENITAL QL NAA+PROBE: NEGATIVE
SPECIMEN SOURCE: NORMAL

## (undated) DEVICE — SET SUCTION/IRRIGATION WITH DISPOSABLE TIP (6/CA )PART #0250-070-520 IS A SUB

## (undated) DEVICE — KIT ANESTHESIA W/CIRCUIT & 3/LT BAG W/FILTER (20EA/CA)

## (undated) DEVICE — CANISTER SUCTION 3000ML MECHANICAL FILTER AUTO SHUTOFF MEDI-VAC NONSTERILE LF DISP  (40EA/CA)

## (undated) DEVICE — SUTURE 2-0 VICRYL PLUS CT-1 36 (36PK/BX)"

## (undated) DEVICE — GLOVE BIOGEL SZ 6.5 SURGICAL PF LTX (50PR/BX 4BX/CA)

## (undated) DEVICE — KIT  I.V. START (100EA/CA)

## (undated) DEVICE — MASK ANESTHESIA ADULT  - (100/CA)

## (undated) DEVICE — SODIUM CHL IRRIGATION 0.9% 1000ML (12EA/CA)

## (undated) DEVICE — HEAD HOLDER JUNIOR/ADULT

## (undated) DEVICE — SUTURE 4-0 VICRYL PLUS FS-2 - 27 INCH (36/BX)

## (undated) DEVICE — TUBING CLEARLINK DUO-VENT - C-FLO (48EA/CA)

## (undated) DEVICE — SUTURE 0 36IN PDS + VIO CT-1 (36PK/BX)

## (undated) DEVICE — SET EXTENSION WITH 2 PORTS (48EA/CA) ***PART #2C8610 IS A SUBSTITUTE*****

## (undated) DEVICE — CHLORAPREP 26 ML APPLICATOR - ORANGE TINT(25/CA)

## (undated) DEVICE — TAPE CLOTH MEDIPORE 6 INCH - (12RL/CA)

## (undated) DEVICE — PACK LAPAROSCOPY - (1/CA)

## (undated) DEVICE — SENSOR OXIMETER ADULT SPO2 RD SET (20EA/BX)

## (undated) DEVICE — TRAY CATHETER FOLEY URINE METER W/STATLOCK 350ML (10EA/CA)

## (undated) DEVICE — CATHETER IV NON-SAFETY 18 GA X 1 1/4 (50/BX 4BX/CA)

## (undated) DEVICE — SUTURE 0 VICRYL PLUS CT-1 - 36 INCH (36/BX)

## (undated) DEVICE — TROCAR5X55 KII SHIELDED SYS - (6/BX)

## (undated) DEVICE — DRAPE LAPAROTOMY T SHEET - (12EA/CA)

## (undated) DEVICE — ELECTRODE 850 FOAM ADHESIVE - HYDROGEL RADIOTRNSPRNT (50/PK)

## (undated) DEVICE — TRAY BLADDER CARE W/ 16 FR FOLEY CATHETER STATLOCK  (10/CA)

## (undated) DEVICE — LIGASURE 5MM BLUNT TIP LONG - 44CM (6EA/PK)

## (undated) DEVICE — PROTECTOR ULNA NERVE - (36PR/CA)

## (undated) DEVICE — PACK MAJOR BASIN - (2EA/CA)

## (undated) DEVICE — GLOVE BIOGEL SZ 7 SURGICAL PF LTX - (50PR/BX 4BX/CA)

## (undated) DEVICE — TRAY SKIN SCRUB PVP WET (20EA/CA) PART #DYND70356 DISCONTINUED

## (undated) DEVICE — SUCTION INSTRUMENT YANKAUER BULBOUS TIP W/O VENT (50EA/CA)

## (undated) DEVICE — SUTURE 0 VICRYL PLUS CT-2 - 27 INCH (36/BX)

## (undated) DEVICE — SUTURE3-0 36IN VCRLY PLS ANTI (36PK/BX)

## (undated) DEVICE — SPONGE GAUZESTER 4 X 4 4PLY - (128PK/CA)

## (undated) DEVICE — SUTURE 2-0 CHROMIC GUT CT-1 27 (36PK/BX)"

## (undated) DEVICE — GOWN WARMING STANDARD FLEX - (30/CA)

## (undated) DEVICE — ELECTRODE DUAL RETURN W/ CORD - (50/PK)

## (undated) DEVICE — SUTURE 3-0 VICRYL PLUS CT-1 - 36 INCH (36/BX)

## (undated) DEVICE — SLEEVE, SEQUENTIAL CALF REG

## (undated) DEVICE — APPLICATOR ENDOSCOPIC SURGICEL (5EA/BX)

## (undated) DEVICE — SHEATH RO 4F 10CM (10EA/BX)

## (undated) DEVICE — TRAY SPINAL ANESTHESIA NON-SAFETY (10/CA)

## (undated) DEVICE — DETERGENT RENUZYME PLUS 10 OZ PACKET (50/BX)

## (undated) DEVICE — BOVIE  BLADE 6 EXTENDED - (50/PK)

## (undated) DEVICE — SUTURE GENERAL

## (undated) DEVICE — PACK C-SECTION (2EA/CA)

## (undated) DEVICE — PAD SANITARY 11IN MAXI IND WRAPPED  (12EA/PK 24PK/CA)

## (undated) DEVICE — SET LEADWIRE 5 LEAD BEDSIDE DISPOSABLE ECG (1SET OF 5/EA)

## (undated) DEVICE — HEMOSTAT ABSORBABLE POWDER SURGICEL 3G (5EA/BX)

## (undated) DEVICE — GLOVE BIOGEL SZ 8.5 SURGICAL PF LTX - (50PR/BX 4BX/CA)

## (undated) DEVICE — TOWEL STOP TIMEOUT SAFETY FLAG (40EA/CA)

## (undated) DEVICE — STAPLER SKIN DISP - (6/BX 10BX/CA) VISISTAT

## (undated) DEVICE — SUTURE 0 VICRYL PLUS CT 36 (36PK/BX)"

## (undated) DEVICE — WATER IRRIG. STER. 1500 ML - (9/CA)

## (undated) DEVICE — BLANKET UNDERBODY FULL ACCES - (5/CA)

## (undated) DEVICE — LACTATED RINGERS INJ 1000 ML - (14EA/CA 60CA/PF)